# Patient Record
Sex: FEMALE | Race: WHITE | NOT HISPANIC OR LATINO | Employment: FULL TIME | URBAN - METROPOLITAN AREA
[De-identification: names, ages, dates, MRNs, and addresses within clinical notes are randomized per-mention and may not be internally consistent; named-entity substitution may affect disease eponyms.]

---

## 2018-05-10 ENCOUNTER — HOSPITAL ENCOUNTER (INPATIENT)
Facility: HOSPITAL | Age: 58
LOS: 3 days | Discharge: HOME | DRG: 390 | End: 2018-05-13
Attending: EMERGENCY MEDICINE | Admitting: SURGERY
Payer: COMMERCIAL

## 2018-05-10 ENCOUNTER — APPOINTMENT (EMERGENCY)
Dept: CT IMAGING | Facility: HOSPITAL | Age: 58
DRG: 390 | End: 2018-05-10
Payer: COMMERCIAL

## 2018-05-10 DIAGNOSIS — K56.609 BOWEL OBSTRUCTION (HCC): Primary | ICD-10-CM

## 2018-05-10 LAB
ALBUMIN SERPL BCP-MCNC: 4.4 G/DL (ref 3.5–5)
ALP SERPL-CCNC: 105 U/L (ref 46–116)
ALT SERPL W P-5'-P-CCNC: 32 U/L (ref 12–78)
ANION GAP SERPL CALCULATED.3IONS-SCNC: 11 MMOL/L (ref 4–13)
AST SERPL W P-5'-P-CCNC: 18 U/L (ref 5–45)
BACTERIA UR QL AUTO: ABNORMAL /HPF
BASOPHILS # BLD AUTO: 0.02 THOUSANDS/ΜL (ref 0–0.1)
BASOPHILS NFR BLD AUTO: 0 % (ref 0–1)
BILIRUB SERPL-MCNC: 0.7 MG/DL (ref 0.2–1)
BILIRUB UR QL STRIP: NEGATIVE
BUN SERPL-MCNC: 17 MG/DL (ref 5–25)
CALCIUM SERPL-MCNC: 9.5 MG/DL (ref 8.3–10.1)
CHLORIDE SERPL-SCNC: 105 MMOL/L (ref 100–108)
CLARITY UR: CLEAR
CO2 SERPL-SCNC: 24 MMOL/L (ref 21–32)
COLOR UR: YELLOW
CREAT SERPL-MCNC: 0.83 MG/DL (ref 0.6–1.3)
EOSINOPHIL # BLD AUTO: 0.04 THOUSAND/ΜL (ref 0–0.61)
EOSINOPHIL NFR BLD AUTO: 0 % (ref 0–6)
ERYTHROCYTE [DISTWIDTH] IN BLOOD BY AUTOMATED COUNT: 12.5 % (ref 11.6–15.1)
GFR SERPL CREATININE-BSD FRML MDRD: 79 ML/MIN/1.73SQ M
GLUCOSE SERPL-MCNC: 132 MG/DL (ref 65–140)
GLUCOSE UR STRIP-MCNC: NEGATIVE MG/DL
HCT VFR BLD AUTO: 42.7 % (ref 34.8–46.1)
HGB BLD-MCNC: 14.8 G/DL (ref 11.5–15.4)
HGB UR QL STRIP.AUTO: NEGATIVE
KETONES UR STRIP-MCNC: ABNORMAL MG/DL
LACTATE SERPL-SCNC: 1.1 MMOL/L (ref 0.5–2)
LEUKOCYTE ESTERASE UR QL STRIP: NEGATIVE
LYMPHOCYTES # BLD AUTO: 1.34 THOUSANDS/ΜL (ref 0.6–4.47)
LYMPHOCYTES NFR BLD AUTO: 12 % (ref 14–44)
MCH RBC QN AUTO: 29.1 PG (ref 26.8–34.3)
MCHC RBC AUTO-ENTMCNC: 34.7 G/DL (ref 31.4–37.4)
MCV RBC AUTO: 84 FL (ref 82–98)
MONOCYTES # BLD AUTO: 0.67 THOUSAND/ΜL (ref 0.17–1.22)
MONOCYTES NFR BLD AUTO: 6 % (ref 4–12)
MUCOUS THREADS UR QL AUTO: ABNORMAL
NEUTROPHILS # BLD AUTO: 9.32 THOUSANDS/ΜL (ref 1.85–7.62)
NEUTS SEG NFR BLD AUTO: 82 % (ref 43–75)
NITRITE UR QL STRIP: NEGATIVE
NON-SQ EPI CELLS URNS QL MICRO: ABNORMAL /HPF
PH UR STRIP.AUTO: 6.5 [PH] (ref 4.5–8)
PLATELET # BLD AUTO: 305 THOUSANDS/UL (ref 149–390)
PMV BLD AUTO: 10.6 FL (ref 8.9–12.7)
POTASSIUM SERPL-SCNC: 3.9 MMOL/L (ref 3.5–5.3)
PROT SERPL-MCNC: 7.8 G/DL (ref 6.4–8.2)
PROT UR STRIP-MCNC: ABNORMAL MG/DL
RBC # BLD AUTO: 5.08 MILLION/UL (ref 3.81–5.12)
RBC #/AREA URNS AUTO: ABNORMAL /HPF
SODIUM SERPL-SCNC: 140 MMOL/L (ref 136–145)
SP GR UR STRIP.AUTO: 1.02 (ref 1–1.03)
UROBILINOGEN UR QL STRIP.AUTO: 0.2 E.U./DL
WBC # BLD AUTO: 11.39 THOUSAND/UL (ref 4.31–10.16)
WBC #/AREA URNS AUTO: ABNORMAL /HPF

## 2018-05-10 PROCEDURE — 36415 COLL VENOUS BLD VENIPUNCTURE: CPT | Performed by: PHYSICIAN ASSISTANT

## 2018-05-10 PROCEDURE — 99285 EMERGENCY DEPT VISIT HI MDM: CPT

## 2018-05-10 PROCEDURE — 83605 ASSAY OF LACTIC ACID: CPT | Performed by: PHYSICIAN ASSISTANT

## 2018-05-10 PROCEDURE — 96374 THER/PROPH/DIAG INJ IV PUSH: CPT

## 2018-05-10 PROCEDURE — 74177 CT ABD & PELVIS W/CONTRAST: CPT

## 2018-05-10 PROCEDURE — 81001 URINALYSIS AUTO W/SCOPE: CPT

## 2018-05-10 PROCEDURE — 96375 TX/PRO/DX INJ NEW DRUG ADDON: CPT

## 2018-05-10 PROCEDURE — 96361 HYDRATE IV INFUSION ADD-ON: CPT

## 2018-05-10 PROCEDURE — 80053 COMPREHEN METABOLIC PANEL: CPT | Performed by: PHYSICIAN ASSISTANT

## 2018-05-10 PROCEDURE — 85025 COMPLETE CBC W/AUTO DIFF WBC: CPT | Performed by: PHYSICIAN ASSISTANT

## 2018-05-10 PROCEDURE — 96376 TX/PRO/DX INJ SAME DRUG ADON: CPT

## 2018-05-10 RX ORDER — PANTOPRAZOLE SODIUM 40 MG/1
40 INJECTION, POWDER, FOR SOLUTION INTRAVENOUS
Status: DISCONTINUED | OUTPATIENT
Start: 2018-05-11 | End: 2018-05-13 | Stop reason: HOSPADM

## 2018-05-10 RX ORDER — LIDOCAINE HYDROCHLORIDE 20 MG/ML
JELLY TOPICAL ONCE
Status: COMPLETED | OUTPATIENT
Start: 2018-05-10 | End: 2018-05-10

## 2018-05-10 RX ORDER — MULTIVITAMIN
1 TABLET ORAL DAILY
COMMUNITY
End: 2021-02-13

## 2018-05-10 RX ORDER — VENLAFAXINE HYDROCHLORIDE 75 MG/1
75 CAPSULE, EXTENDED RELEASE ORAL DAILY
COMMUNITY
Start: 2016-05-09

## 2018-05-10 RX ORDER — SODIUM CHLORIDE, SODIUM LACTATE, POTASSIUM CHLORIDE, CALCIUM CHLORIDE 600; 310; 30; 20 MG/100ML; MG/100ML; MG/100ML; MG/100ML
125 INJECTION, SOLUTION INTRAVENOUS CONTINUOUS
Status: DISCONTINUED | OUTPATIENT
Start: 2018-05-10 | End: 2018-05-13 | Stop reason: HOSPADM

## 2018-05-10 RX ORDER — ONDANSETRON 2 MG/ML
4 INJECTION INTRAMUSCULAR; INTRAVENOUS EVERY 6 HOURS PRN
Status: DISCONTINUED | OUTPATIENT
Start: 2018-05-10 | End: 2018-05-13 | Stop reason: HOSPADM

## 2018-05-10 RX ORDER — ONDANSETRON 2 MG/ML
4 INJECTION INTRAMUSCULAR; INTRAVENOUS ONCE
Status: COMPLETED | OUTPATIENT
Start: 2018-05-10 | End: 2018-05-10

## 2018-05-10 RX ADMIN — SODIUM CHLORIDE, SODIUM LACTATE, POTASSIUM CHLORIDE, AND CALCIUM CHLORIDE 125 ML/HR: .6; .31; .03; .02 INJECTION, SOLUTION INTRAVENOUS at 16:00

## 2018-05-10 RX ADMIN — ONDANSETRON 4 MG: 2 INJECTION INTRAMUSCULAR; INTRAVENOUS at 12:45

## 2018-05-10 RX ADMIN — IOHEXOL 100 ML: 350 INJECTION, SOLUTION INTRAVENOUS at 14:40

## 2018-05-10 RX ADMIN — ENOXAPARIN SODIUM 40 MG: 40 INJECTION SUBCUTANEOUS at 16:20

## 2018-05-10 RX ADMIN — SODIUM CHLORIDE, SODIUM LACTATE, POTASSIUM CHLORIDE, AND CALCIUM CHLORIDE 125 ML/HR: .6; .31; .03; .02 INJECTION, SOLUTION INTRAVENOUS at 21:10

## 2018-05-10 RX ADMIN — HYDROMORPHONE HYDROCHLORIDE 0.5 MG: 1 INJECTION, SOLUTION INTRAMUSCULAR; INTRAVENOUS; SUBCUTANEOUS at 17:04

## 2018-05-10 RX ADMIN — Medication 1 SPRAY: at 19:28

## 2018-05-10 RX ADMIN — HYDROMORPHONE HYDROCHLORIDE 0.5 MG: 1 INJECTION, SOLUTION INTRAMUSCULAR; INTRAVENOUS; SUBCUTANEOUS at 20:53

## 2018-05-10 RX ADMIN — Medication 1 SPRAY: at 17:00

## 2018-05-10 RX ADMIN — SODIUM CHLORIDE 1000 ML: 0.9 INJECTION, SOLUTION INTRAVENOUS at 12:42

## 2018-05-10 RX ADMIN — ONDANSETRON 4 MG: 2 INJECTION INTRAMUSCULAR; INTRAVENOUS at 14:05

## 2018-05-10 RX ADMIN — HYDROMORPHONE HYDROCHLORIDE 1 MG: 1 INJECTION, SOLUTION INTRAMUSCULAR; INTRAVENOUS; SUBCUTANEOUS at 12:48

## 2018-05-10 RX ADMIN — HYDROMORPHONE HYDROCHLORIDE 1 MG: 1 INJECTION, SOLUTION INTRAMUSCULAR; INTRAVENOUS; SUBCUTANEOUS at 14:08

## 2018-05-10 RX ADMIN — LIDOCAINE HYDROCHLORIDE: 20 JELLY TOPICAL at 16:10

## 2018-05-10 NOTE — ED PROVIDER NOTES
History  Chief Complaint   Patient presents with    Abdominal Pain     Pt presents to the ED for evaluation of "excruciating" abdominal pain in the lower right abdomen that started last night  Pt reports Nausea and vomitting x 2  Pt reports pain worsens when laying down     This is a 61-year-old female patient who has a history of cholecystectomy appendectomy and hysterectomy  Last night she started with severe sharp pain in her right lower quadrant they will is now radiating posterior  Today she had 2-3 bouts of vomiting  She has no known injury  She did have some chills last night but does not believe she had fever  Holding still makes it better any movement makes it worse  She denies urgency frequency or dysuria  No vaginal bleeding or discharge  No headache blurred vision double vision no cough congestion sore throat  She stated that her last who bowel movement was just prior to arrival and was soft but not vivi diarrhea without blood  She has taken nothing over-the-counter  Differential diagnosis includes not limited to diverticulitis, colitis, ovarian torsion, kidney stone, bowel obstructions multiple previous surgeries            Prior to Admission Medications   Prescriptions Last Dose Informant Patient Reported? Taking? Calcium-Vitamin D-Vitamin K (VIACTIV) [phone] MG-UNT-MCG CHEW  Self Yes Yes   Sig: Chew 1 tablet daily   Multiple Vitamin (MULTIVITAMIN) tablet  Self Yes Yes   Sig: Take 1 tablet by mouth daily   venlafaxine (EFFEXOR-XR) 75 mg 24 hr capsule  Self Yes Yes   Sig: Take 75 mg by mouth daily      Facility-Administered Medications: None       History reviewed  No pertinent past medical history  Past Surgical History:   Procedure Laterality Date    APPENDECTOMY      CHOLECYSTECTOMY      HYSTERECTOMY      NECK SURGERY         History reviewed  No pertinent family history  I have reviewed and agree with the history as documented      Social History   Substance Use Topics    Smoking status: Never Smoker    Smokeless tobacco: Not on file    Alcohol use No        Review of Systems   All other systems reviewed and are negative  Physical Exam  ED Triage Vitals [05/10/18 1149]   Temperature Pulse Respirations Blood Pressure SpO2   98 1 °F (36 7 °C) 81 20 128/60 100 %      Temp Source Heart Rate Source Patient Position - Orthostatic VS BP Location FiO2 (%)   Oral Monitor Sitting Left arm --      Pain Score       8           Orthostatic Vital Signs  Vitals:    05/10/18 1149 05/10/18 1340 05/10/18 1400 05/10/18 1456   BP: 128/60 115/57 113/62 113/65   Pulse: 81 69 60 68   Patient Position - Orthostatic VS: Sitting Lying Lying Lying       Physical Exam   Constitutional: She appears well-developed and well-nourished  HENT:   Head: Normocephalic and atraumatic  Right Ear: External ear normal    Left Ear: External ear normal    Nose: Nose normal    Mouth/Throat: Oropharynx is clear and moist    Eyes: Conjunctivae are normal  Pupils are equal, round, and reactive to light  Neck: Normal range of motion  Neck supple  Cardiovascular: Normal rate and regular rhythm  Pulmonary/Chest: Effort normal and breath sounds normal    Abdominal: Soft  Bowel sounds are normal        Neurological: She is alert  Skin: Skin is warm  Psychiatric: She has a normal mood and affect  Her behavior is normal    Nursing note and vitals reviewed        ED Medications  Medications   sodium chloride 0 9 % bolus 1,000 mL (0 mL Intravenous Stopped 5/10/18 1400)   HYDROmorphone (DILAUDID) injection 1 mg (1 mg Intravenous Given 5/10/18 1248)   ondansetron (ZOFRAN) injection 4 mg (4 mg Intravenous Given 5/10/18 1245)   HYDROmorphone (DILAUDID) injection 1 mg (1 mg Intravenous Given 5/10/18 1408)   ondansetron (ZOFRAN) injection 4 mg (4 mg Intravenous Given 5/10/18 1405)   iohexol (OMNIPAQUE) 350 MG/ML injection (MULTI-DOSE) 100 mL (100 mL Intravenous Given 5/10/18 1440)       Diagnostic Studies  Results Reviewed Procedure Component Value Units Date/Time    Urine Microscopic [[id number]]  (Abnormal) Collected:  05/10/18 1257    Lab Status:  Final result Specimen:  Urine from Urine, Clean Catch Updated:  05/10/18 1323     RBC, UA None Seen /hpf      WBC, UA None Seen /hpf      Epithelial Cells Occasional /hpf      Bacteria, UA Occasional /hpf      MUCOUS THREADS Innumerable (A)    Comprehensive metabolic panel [[id number]] Collected:  05/10/18 1240    Lab Status:  Final result Specimen:  Blood from Arm, Right Updated:  05/10/18 1308     Sodium 140 mmol/L      Potassium 3 9 mmol/L      Chloride 105 mmol/L      CO2 24 mmol/L      Anion Gap 11 mmol/L      BUN 17 mg/dL      Creatinine 0 83 mg/dL      Glucose 132 mg/dL      Calcium 9 5 mg/dL      AST 18 U/L      ALT 32 U/L      Alkaline Phosphatase 105 U/L      Total Protein 7 8 g/dL      Albumin 4 4 g/dL      Total Bilirubin 0 70 mg/dL      eGFR 79 ml/min/1 73sq m     Narrative:         National Kidney Disease Education Program recommendations are as follows:  GFR calculation is accurate only with a steady state creatinine  Chronic Kidney disease less than 60 ml/min/1 73 sq  meters  Kidney failure less than 15 ml/min/1 73 sq  meters  Lactic acid, plasma [[id number]]  (Normal) Collected:  05/10/18 1240    Lab Status:  Final result Specimen:  Blood from Arm, Right Updated:  05/10/18 1308     LACTIC ACID 1 1 mmol/L     Narrative:         Result may be elevated if tourniquet was used during collection      ED Urine Macroscopic [[id number]]  (Abnormal) Collected:  05/10/18 1257    Lab Status:  Final result Specimen:  Urine Updated:  05/10/18 1255     Color, UA Yellow     Clarity, UA Clear     pH, UA 6 5     Leukocytes, UA Negative     Nitrite, UA Negative     Protein, UA Trace (A) mg/dl      Glucose, UA Negative mg/dl      Ketones, UA 80 (3+) (A) mg/dl      Urobilinogen, UA 0 2 E U /dl      Bilirubin, UA Negative     Blood, UA Negative     Specific Gravity, UA 1 025    Narrative: CLINITEK RESULT    CBC and differential [[id number]]  (Abnormal) Collected:  05/10/18 1240    Lab Status:  Final result Specimen:  Blood from Arm, Right Updated:  05/10/18 1249     WBC 11 39 (H) Thousand/uL      RBC 5 08 Million/uL      Hemoglobin 14 8 g/dL      Hematocrit 42 7 %      MCV 84 fL      MCH 29 1 pg      MCHC 34 7 g/dL      RDW 12 5 %      MPV 10 6 fL      Platelets 567 Thousands/uL      Neutrophils Relative 82 (H) %      Lymphocytes Relative 12 (L) %      Monocytes Relative 6 %      Eosinophils Relative 0 %      Basophils Relative 0 %      Neutrophils Absolute 9 32 (H) Thousands/µL      Lymphocytes Absolute 1 34 Thousands/µL      Monocytes Absolute 0 67 Thousand/µL      Eosinophils Absolute 0 04 Thousand/µL      Basophils Absolute 0 02 Thousands/µL                  CT abdomen pelvis with contrast   Final Result by Senait Roman MD (05/10 1522)      High-grade small bowel obstruction in the right lower quadrant likely due to an adhesion best appreciated on image 601/50 with fecalization of bowel contents in the adjacent proximal ileal loop  Minimal pelvic free fluid  Workstation performed: [id number]                    Procedures  Procedures       Phone Contacts  ED Phone Contact    ED Course                               MDM  Number of Diagnoses or Management Options  Diagnosis management comments: This is a 55-year-old female patient presented 1 day history of right lower quadrant pain and vomiting  CT scan revealed a high-grade bowel obstruction right lower quadrant secondary to adhesions  Spoke with Dr Angelia Carrizales explained case in detail who accepted admission  CritCare Time    Disposition  Final diagnoses:    Bowel obstruction ([address] )     Time reflects when diagnosis was documented in both MDM as applicable and the Disposition within this note     Time User Action Codes Description Comment    5/10/2018  3:36  [address] [R42 [id number]] Bowel obstruction [facility])       ED Disposition ED Disposition Condition Comment    Admit  Case was discussed with dr Vasyl Cota and the patient's admission status was agreed to be Admission Status: inpatient status to the service of Dr Vasyl Cota   Follow-up Information    None       Patient's Medications   Discharge Prescriptions    No medications on file     No discharge procedures on file      ED Provider  Electronically Signed by           Kayleigh Sharma PA-C  05/10/18 1539

## 2018-05-10 NOTE — H&P
History and Physical - General Surgery  Krunal Amaya 65 y o  female MRN: [id number]  Unit/Bed#: ED 05 Encounter: [id number]        Assessment/Plan     Assessment:  65 F with high grade SBO    Plan:  NPO  Will place NGT, keep to low continuous wall suction  IV fluids  OOB and ambulate  PRN pain control  Lovenox for DVT ppx    History of Present Illness     HPI:  Krunal Amaya is a 65 y o  female who presents with abdominal pain and bloating  She states it started yesterday with crampy pain  This worsened and became sharp in nature  She also endorses nausea and vomiting which was non-bloody  She denies any changes in bowel habits  Her last bowel movement was this morning  She does have a past surgical history significant for cholecystectomy, hysterectomy, and appendectomy  She is otherwise relatively healthy  She denies fevers, chills, chest pain, SOB, dysuria or other urinary symptoms    Review of Systems   Constitutional: Positive for appetite change  Negative for activity change, chills, fatigue and fever  HENT: Negative  Eyes: Negative  Respiratory: Negative  Cardiovascular: Negative  Gastrointestinal: Positive for abdominal distention, abdominal pain, nausea and vomiting  Negative for blood in stool and diarrhea  Endocrine: Negative  Genitourinary: Negative  Musculoskeletal: Negative  Skin: Negative  Allergic/Immunologic: Negative  Neurological: Negative  Hematological: Negative  Psychiatric/Behavioral: Negative  Historical Information   History reviewed  No pertinent past medical history    Past Surgical History:   Procedure Laterality Date    APPENDECTOMY      CHOLECYSTECTOMY      HYSTERECTOMY      NECK SURGERY       Social History   History   Alcohol Use No     History   Drug Use No     History   Smoking Status    Never Smoker   Smokeless Tobacco    Not on file     Family History: non-contributory    Meds/Allergies   PTA meds:   Prior to Admission Medications Prescriptions Last Dose Informant Patient Reported? Taking? Calcium-Vitamin D-Vitamin K (VIACTIV) [phone] MG-UNT-MCG CHEW  Self Yes Yes   Sig: Chew 1 tablet daily   Multiple Vitamin (MULTIVITAMIN) tablet  Self Yes Yes   Sig: Take 1 tablet by mouth daily   venlafaxine (EFFEXOR-XR) 75 mg 24 hr capsule  Self Yes Yes   Sig: Take 75 mg by mouth daily      Facility-Administered Medications: None     No Known Allergies    Objective   First Vitals:   Blood Pressure: 128/60 (05/10/18 1149)  Pulse: 81 (05/10/18 1149)  Temperature: 98 1 °F (36 7 °C) (05/10/18 1149)  Temp Source: Oral (05/10/18 1149)  Respirations: 20 (05/10/18 1149)  SpO2: 100 % (05/10/18 1149)    Current Vitals:   Blood Pressure: 113/65 (05/10/18 1456)  Pulse: 68 (05/10/18 1456)  Temperature: 98 1 °F (36 7 °C) (05/10/18 1149)  Temp Source: Oral (05/10/18 1149)  Respirations: 18 (05/10/18 1456)  SpO2: 97 % (05/10/18 1456)      Intake/Output Summary (Last 24 hours) at 05/10/18 1537  Last data filed at 05/10/18 1400   Gross per 24 hour   Intake             1000 ml   Output                0 ml   Net             1000 ml       Invasive Devices     Peripheral Intravenous Line            Peripheral IV 05/10/18 Right Antecubital less than 1 day                Physical Exam   Constitutional: She is oriented to person, place, and time  She appears well-developed and well-nourished  HENT:   Head: Normocephalic  Eyes: Pupils are equal, round, and reactive to light  Neck: Normal range of motion  Cardiovascular: Normal rate and regular rhythm  Pulmonary/Chest: Effort normal  No respiratory distress  She exhibits no tenderness  Abdominal: Soft  She exhibits no distension  There is no tenderness  There is no rebound and no guarding  Musculoskeletal: Normal range of motion  She exhibits no tenderness  Neurological: She is alert and oriented to person, place, and time  Skin: Skin is warm and dry  Psychiatric: She has a normal mood and affect   Her behavior is normal        Lab Results:   CBC:   Lab Results   Component Value Date    WBC 11 39 (H) 05/10/2018    HGB 14 8 05/10/2018    HCT 42 7 05/10/2018    MCV 84 05/10/2018     05/10/2018    MCH 29 1 05/10/2018    MCHC 34 7 05/10/2018    RDW 12 5 05/10/2018    MPV 10 6 05/10/2018   , CMP:   Lab Results   Component Value Date     05/10/2018    K 3 9 05/10/2018     05/10/2018    CO2 24 05/10/2018    ANIONGAP 11 05/10/2018    BUN 17 05/10/2018    CREATININE 0 83 05/10/2018    GLUCOSE 132 05/10/2018    CALCIUM 9 5 05/10/2018    AST 18 05/10/2018    ALT 32 05/10/2018    ALKPHOS 105 05/10/2018    PROT 7 8 05/10/2018    BILITOT 0 70 05/10/2018    EGFR 79 05/10/2018     Imaging: I have personally reviewed pertinent reports  EKG, Pathology, and Other Studies: I have personally reviewed pertinent reports        Code Status: No Order  Advance Directive and Living Will:      Power of :    POLST:

## 2018-05-10 NOTE — ED NOTES
Pt assisted onto hospital bed for increased patient comfort  NG tube patent-on low-intermittent suction-yellow bile  Pt comfortable  VS  Will continue to monitor       Nya Abdi RN  05/10/18 2175

## 2018-05-11 ENCOUNTER — APPOINTMENT (INPATIENT)
Dept: RADIOLOGY | Facility: HOSPITAL | Age: 58
DRG: 390 | End: 2018-05-11
Payer: COMMERCIAL

## 2018-05-11 PROBLEM — K56.609 SBO (SMALL BOWEL OBSTRUCTION) (HCC): Status: ACTIVE | Noted: 2018-05-11

## 2018-05-11 LAB
ANION GAP SERPL CALCULATED.3IONS-SCNC: 9 MMOL/L (ref 4–13)
BASOPHILS # BLD AUTO: 0.04 THOUSANDS/ΜL (ref 0–0.1)
BASOPHILS NFR BLD AUTO: 1 % (ref 0–1)
BUN SERPL-MCNC: 12 MG/DL (ref 5–25)
CALCIUM SERPL-MCNC: 8.5 MG/DL (ref 8.3–10.1)
CHLORIDE SERPL-SCNC: 109 MMOL/L (ref 100–108)
CO2 SERPL-SCNC: 26 MMOL/L (ref 21–32)
CREAT SERPL-MCNC: 0.79 MG/DL (ref 0.6–1.3)
EOSINOPHIL # BLD AUTO: 0.28 THOUSAND/ΜL (ref 0–0.61)
EOSINOPHIL NFR BLD AUTO: 4 % (ref 0–6)
ERYTHROCYTE [DISTWIDTH] IN BLOOD BY AUTOMATED COUNT: 12.6 % (ref 11.6–15.1)
GFR SERPL CREATININE-BSD FRML MDRD: 84 ML/MIN/1.73SQ M
GLUCOSE SERPL-MCNC: 96 MG/DL (ref 65–140)
HCT VFR BLD AUTO: 39.1 % (ref 34.8–46.1)
HGB BLD-MCNC: 13 G/DL (ref 11.5–15.4)
LYMPHOCYTES # BLD AUTO: 2.53 THOUSANDS/ΜL (ref 0.6–4.47)
LYMPHOCYTES NFR BLD AUTO: 34 % (ref 14–44)
MAGNESIUM SERPL-MCNC: 2 MG/DL (ref 1.6–2.6)
MCH RBC QN AUTO: 29.3 PG (ref 26.8–34.3)
MCHC RBC AUTO-ENTMCNC: 33.2 G/DL (ref 31.4–37.4)
MCV RBC AUTO: 88 FL (ref 82–98)
MONOCYTES # BLD AUTO: 0.71 THOUSAND/ΜL (ref 0.17–1.22)
MONOCYTES NFR BLD AUTO: 9 % (ref 4–12)
NEUTROPHILS # BLD AUTO: 3.97 THOUSANDS/ΜL (ref 1.85–7.62)
NEUTS SEG NFR BLD AUTO: 52 % (ref 43–75)
PHOSPHATE SERPL-MCNC: 3.7 MG/DL (ref 2.7–4.5)
PLATELET # BLD AUTO: 239 THOUSANDS/UL (ref 149–390)
PMV BLD AUTO: 10.6 FL (ref 8.9–12.7)
POTASSIUM SERPL-SCNC: 3.6 MMOL/L (ref 3.5–5.3)
RBC # BLD AUTO: 4.44 MILLION/UL (ref 3.81–5.12)
SODIUM SERPL-SCNC: 144 MMOL/L (ref 136–145)
WBC # BLD AUTO: 7.53 THOUSAND/UL (ref 4.31–10.16)

## 2018-05-11 PROCEDURE — 85025 COMPLETE CBC W/AUTO DIFF WBC: CPT | Performed by: STUDENT IN AN ORGANIZED HEALTH CARE EDUCATION/TRAINING PROGRAM

## 2018-05-11 PROCEDURE — 80048 BASIC METABOLIC PNL TOTAL CA: CPT | Performed by: STUDENT IN AN ORGANIZED HEALTH CARE EDUCATION/TRAINING PROGRAM

## 2018-05-11 PROCEDURE — 74022 RADEX COMPL AQT ABD SERIES: CPT

## 2018-05-11 PROCEDURE — 87493 C DIFF AMPLIFIED PROBE: CPT | Performed by: SURGERY

## 2018-05-11 PROCEDURE — 84100 ASSAY OF PHOSPHORUS: CPT | Performed by: SURGERY

## 2018-05-11 PROCEDURE — 83735 ASSAY OF MAGNESIUM: CPT | Performed by: STUDENT IN AN ORGANIZED HEALTH CARE EDUCATION/TRAINING PROGRAM

## 2018-05-11 PROCEDURE — C9113 INJ PANTOPRAZOLE SODIUM, VIA: HCPCS | Performed by: SURGERY

## 2018-05-11 RX ORDER — POTASSIUM CHLORIDE 14.9 MG/ML
20 INJECTION INTRAVENOUS ONCE
Status: COMPLETED | OUTPATIENT
Start: 2018-05-11 | End: 2018-05-11

## 2018-05-11 RX ADMIN — ENOXAPARIN SODIUM 40 MG: 40 INJECTION SUBCUTANEOUS at 08:49

## 2018-05-11 RX ADMIN — SODIUM CHLORIDE, SODIUM LACTATE, POTASSIUM CHLORIDE, AND CALCIUM CHLORIDE 125 ML/HR: .6; .31; .03; .02 INJECTION, SOLUTION INTRAVENOUS at 21:49

## 2018-05-11 RX ADMIN — PANTOPRAZOLE SODIUM 40 MG: 40 INJECTION, POWDER, FOR SOLUTION INTRAVENOUS at 08:49

## 2018-05-11 RX ADMIN — HYDROMORPHONE HYDROCHLORIDE 0.2 MG: 1 INJECTION, SOLUTION INTRAMUSCULAR; INTRAVENOUS; SUBCUTANEOUS at 05:13

## 2018-05-11 RX ADMIN — HYDROMORPHONE HYDROCHLORIDE 0.5 MG: 1 INJECTION, SOLUTION INTRAMUSCULAR; INTRAVENOUS; SUBCUTANEOUS at 00:26

## 2018-05-11 RX ADMIN — SODIUM CHLORIDE, SODIUM LACTATE, POTASSIUM CHLORIDE, AND CALCIUM CHLORIDE 125 ML/HR: .6; .31; .03; .02 INJECTION, SOLUTION INTRAVENOUS at 05:10

## 2018-05-11 RX ADMIN — POTASSIUM CHLORIDE 20 MEQ: 200 INJECTION, SOLUTION INTRAVENOUS at 07:42

## 2018-05-11 RX ADMIN — SODIUM CHLORIDE, SODIUM LACTATE, POTASSIUM CHLORIDE, AND CALCIUM CHLORIDE 125 ML/HR: .6; .31; .03; .02 INJECTION, SOLUTION INTRAVENOUS at 13:45

## 2018-05-11 NOTE — CASE MANAGEMENT
Initial Clinical Review    Admission: Date/Time/Statement: 5/10/18 @ 1538     Orders Placed This Encounter   Procedures    Inpatient Admission (expected length of stay for this patient is greater than two midnights)     Standing Status:   Standing     Number of Occurrences:   1     Order Specific Question:   Admitting Physician     Answer:   GEORGES Lund [[id number]]     Order Specific Question:   Level of Care     Answer:   Med Surg [[id number]]     Order Specific Question:   Estimated length of stay     Answer:   More than 2 Midnights     Order Specific Question:   Certification     Answer:   I certify that inpatient services are medically necessary for this patient for a duration of greater than two midnights  See H&P and MD Progress Notes for additional information about the patient's course of treatment  ED: Date/Time/Mode of Arrival:   ED Arrival Information     Expected Arrival Acuity Means of Arrival Escorted By Service Admission Type    - 5/10/2018 11:36 Urgent Walk-In Self Surgery-General Urgent    Arrival Complaint    Abdominal Pain           Chief Complaint:   Chief Complaint   Patient presents with    Abdominal Pain     Pt presents to the ED for evaluation of "excruciating" abdominal pain in the lower right abdomen that started last night  Pt reports Nausea and vomitting x 2  Pt reports pain worsens when laying down       History of Illness:  65 y o  female who presents with abdominal pain and bloating  She states it started yesterday with crampy pain  This worsened and became sharp in nature  She also endorses nausea and vomiting which was non-bloody  She denies any changes in bowel habits  Her last bowel movement was this morning  She does have a past surgical history significant for cholecystectomy, hysterectomy, and appendectomy       ED Vital Signs:   ED Triage Vitals [05/10/18 1149]   Temperature Pulse Respirations Blood Pressure SpO2   98 1 °F (36 7 °C) 81 20 128/60 100 %      Temp Source Heart Rate Source Patient Position - Orthostatic VS BP Location FiO2 (%)   Oral Monitor Sitting Left arm --      Pain Score       8        Wt Readings from Last 1 Encounters:   05/10/18 88 kg (194 lb 0 1 oz)       Vital Signs (abnormal): none  Exam abdomen - RLQ with guarding and some rebound    Abnormal Labs/Diagnostic Test Results:   Wbc 11 39  UA tr protein  3 + ketones  Ct abdomen - High-grade small bowel obstruction in the right lower quadrant likely due to an adhesion best appreciated on image 601/50 with fecalization of bowel contents in the adjacent proximal ileal loop   Minimal pelvic free fluid      ED Treatment: NGT to medium suction  Medication Administration from 05/10/2018 1136 to 05/10/2018 2027       Date/Time Order Dose Route Action Action by Comments     05/10/2018 1400 sodium chloride 0 9 % bolus 1,000 mL 0 mL Intravenous Stopped Melissa Pereira RN      05/10/2018 1242 sodium chloride 0 9 % bolus 1,000 mL 1,000 mL Intravenous [address] Sakshi Vazquez RN      05/10/2018 1248 HYDROmorphone (DILAUDID) injection 1 mg 1 mg Intravenous Given Sakshi Vazquez RN      05/10/2018 1245 ondansetron (ZOFRAN) injection 4 mg 4 mg Intravenous Given Sakshi Vazquez RN      05/10/2018 1408 HYDROmorphone (DILAUDID) injection 1 mg 1 mg Intravenous Given Melissa Pereira RN      05/10/2018 1405 ondansetron (ZOFRAN) injection 4 mg 4 mg Intravenous Given Melissa Pereira RN      05/10/2018 1440 iohexol (OMNIPAQUE) 350 MG/ML injection (MULTI-DOSE) 100 mL 100 mL Intravenous Given Catalina Blue      05/10/2018 1600 lactated ringers infusion 125 mL/hr Intravenous New Bag Melissa Pereira RN      05/10/2018 1620 enoxaparin (LOVENOX) subcutaneous injection 40 mg 40 mg Subcutaneous Given Melissa Pereira RN      05/10/2018 1704 HYDROmorphone (DILAUDID) injection 0 5 mg 0 5 mg Intravenous Given Jose L Gaines RN      05/10/2018 1705 HYDROmorphone (DILAUDID) injection 0 2 mg 0 2 mg Intravenous Not Given Melissa Pereira RN      05/10/2018 1610 lidocaine (URO-JET) 2 % topical gel   Topical Given Nate Lowry RN medication used for NG tube insertion     05/10/2018 1928 phenol (CHLORASEPTIC) 1 4 % mucosal liquid 1 spray 1 spray Mouth/Throat Given Clark Taylor RN      05/10/2018 1700 phenol (CHLORASEPTIC) 1 4 % mucosal liquid 1 spray 1 spray Mouth/Throat Given Nate Lowry RN           Past Medical/Surgical History: Active Ambulatory Problems     Diagnosis Date Noted    No Active Ambulatory Problems     Resolved Ambulatory Problems     Diagnosis Date Noted    No Resolved Ambulatory Problems     No Additional Past Medical History       Admitting Diagnosis: Bowel obstruction ([facility] [address] ) [K56 609]  Abdominal pain [R10 9]    Age/Sex: 65 y o  female    Assessment/Plan:  65 F with high grade SBO     Plan:  NPO  Will place NGT, keep to low continuous wall suction  IV fluids  OOB and ambulate  PRN pain control  Lovenox for DVT ppx    Admission Orders: 5/10/2018  1538 INPATIENT  Scheduled Meds:   Current Facility-Administered Medications:  enoxaparin 40 mg Subcutaneous Daily Jenna Shelby MD    HYDROmorphone 0 2 mg Intravenous Q4H PRN Jenna Shelby MD    HYDROmorphone 0 5 mg Intravenous Q3H PRN Jenna Shelby MD    lactated ringers 125 mL/hr Intravenous Continuous Jenna Shelby MD Last Rate: 125 mL/hr (05/11/18 0510)   ondansetron 4 mg Intravenous Q6H PRN Jenna Shelby MD    pantoprazole 40 mg Intravenous Q24H [address] Yuniel Morejon MD    phenol 1 spray Mouth/Throat Q2H PRN Jeb Eason DO      Continuous Infusions:   lactated ringers 125 mL/hr Last Rate: 125 mL/hr (05/11/18 0510)     PRN Meds: HYDROmorphone 0 2 iv - used x 1 (5/11- 0513)    HYDROmorphone 0 5 iv - used x 2 (1704; 2053; 0026)    Phenol - used x 2       NGT to medium continuous suction  scds  NPO

## 2018-05-11 NOTE — PROGRESS NOTES
Progress Note - General Surgery   Charles Valencia 65 y o  female MRN: [id number]  Unit/Bed#: -01 Encounter: [id number]    Assessment:  A 65 y o  Presents with high grade SBO    Plan  1  SBO  - NPO/NGT decompression  - abdominal obstruction series today  - serial abdominal exams  - pain control prn  - oob/amb    2  DVT ppx: lovenox    3  GI ppx: Protonix    Subjective/Objective     Subjective: No acute events overnight  Patient feels markedly better with the NGT in place  No nausea, no vomiting  No flatus  No BM  Patient does not feel distended    Objective:     Vitals: Blood pressure 114/60, pulse 68, temperature 98 °F (36 7 °C), temperature source Oral, resp  rate 18, weight 88 kg (194 lb 0 1 oz), SpO2 95 %  ,There is no height or weight on file to calculate BMI  I/O       05/09 0701 - 05/10 0700 05/10 0701 - 05/11 0700 05/11 0701 - 05/12 0700    I V  (mL/kg)  1645 8 (18 7)     NG/GT  20     IV Piggyback  1000     Total Intake(mL/kg)  2665 8 (30 3)     Urine (mL/kg/hr)  400     Emesis/NG output  250     Total Output   650      Net   +2015 8                   Physical Exam:   Gen: comfortable, NAD  Abd: soft, ND, NT    Lab, Imaging and other studies:      Lab Results   Component Value Date    WBC 7 53 05/11/2018    HGB 13 0 05/11/2018    HCT 39 1 05/11/2018    MCV 88 05/11/2018     05/11/2018         Lab Results   Component Value Date    GLUCOSE 96 05/11/2018    CALCIUM 8 5 05/11/2018     05/11/2018    K 3 6 05/11/2018    CO2 26 05/11/2018     (H) 05/11/2018    BUN 12 05/11/2018    CREATININE 0 79 05/11/2018       VTE Pharmacologic Prophylaxis: Enoxaparin (Lovenox)  VTE Mechanical Prophylaxis: sequential compression device

## 2018-05-12 LAB — C DIFF TOX GENS STL QL NAA+PROBE: NORMAL

## 2018-05-12 PROCEDURE — C9113 INJ PANTOPRAZOLE SODIUM, VIA: HCPCS | Performed by: SURGERY

## 2018-05-12 RX ORDER — VENLAFAXINE HYDROCHLORIDE 37.5 MG/1
75 CAPSULE, EXTENDED RELEASE ORAL DAILY
Status: DISCONTINUED | OUTPATIENT
Start: 2018-05-12 | End: 2018-05-13 | Stop reason: HOSPADM

## 2018-05-12 RX ADMIN — SODIUM CHLORIDE, SODIUM LACTATE, POTASSIUM CHLORIDE, AND CALCIUM CHLORIDE 125 ML/HR: .6; .31; .03; .02 INJECTION, SOLUTION INTRAVENOUS at 22:51

## 2018-05-12 RX ADMIN — SODIUM CHLORIDE, SODIUM LACTATE, POTASSIUM CHLORIDE, AND CALCIUM CHLORIDE 125 ML/HR: .6; .31; .03; .02 INJECTION, SOLUTION INTRAVENOUS at 05:53

## 2018-05-12 RX ADMIN — VENLAFAXINE HYDROCHLORIDE 75 MG: 37.5 CAPSULE, EXTENDED RELEASE ORAL at 08:59

## 2018-05-12 RX ADMIN — ENOXAPARIN SODIUM 40 MG: 40 INJECTION SUBCUTANEOUS at 08:49

## 2018-05-12 RX ADMIN — PANTOPRAZOLE SODIUM 40 MG: 40 INJECTION, POWDER, FOR SOLUTION INTRAVENOUS at 08:49

## 2018-05-12 NOTE — PROGRESS NOTES
Progress Note -Surgery TRISH Sheparde Lelo 65 y o  female MRN: [id number]  Unit/Bed#: -01 Encounter: [id number]    ASSESSMENT/PLAN:  Problem List     * (Principal)SBO (small bowel obstruction) ([facility] [address] )   66 yo with SBO s/p NGT removal and is now tolerating fulls diet  C dif (-)  · Advance diet to surgical soft most likely  · Encouraged OOB and ambulate  · Pain control PRN  · Poss discharge home later today or tomorrow       VTE Pharmacologic Prophylaxis: Sequential compression device (Venodyne)  and Enoxaparin (Lovenox)    Subjective/Objective     Subjective: No complaints  Denies N/V  +flatus    Objective/Physical Exam: Blood pressure 124/64, pulse 76, temperature 97 9 °F (36 6 °C), temperature source Oral, resp  rate 16, weight 88 kg (194 lb 0 1 oz), SpO2 98 %  ,There is no height or weight on file to calculate BMI      General appearance: alert and oriented, in no acute distress  Heart: regular rate and rhythm, S1, S2 normal, no murmur, click, rub or gallop  Lungs: clear to auscultation bilaterally  Abdomen: soft, non-tender; bowel sounds normal; no masses,  no organomegaly  Neurological: normal without focal findings      Current Facility-Administered Medications:     enoxaparin (LOVENOX) subcutaneous injection 40 mg, 40 mg, Subcutaneous, Daily, Whitney Ghotra MD, 40 mg at 05/12/18 0849    HYDROmorphone (DILAUDID) injection 0 2 mg, 0 2 mg, Intravenous, Q4H PRN, Whitney Ghotra MD, 0 2 mg at 05/11/18 0513    HYDROmorphone (DILAUDID) injection 0 5 mg, 0 5 mg, Intravenous, Q3H PRN, Whitney Ghotra MD, 0 5 mg at 05/11/18 0026    lactated ringers infusion, 125 mL/hr, Intravenous, Continuous, Whitney Ghotra MD, Last Rate: 125 mL/hr at 05/13/18 0642, 125 mL/hr at 05/13/18 0642    ondansetron (ZOFRAN) injection 4 mg, 4 mg, Intravenous, Q6H PRN, Whitney Ghotra MD    pantoprazole (PROTONIX) injection 40 mg, 40 mg, Intravenous, Q24H [facility], Kahlil Curiel MD, 40 mg at 05/13/18 0740    phenol (CHLORASEPTIC) 1 4 % mucosal liquid 1 spray, 1 spray, Mouth/Throat, Q2H PRN, Jeb Eason DO, 1 spray at 05/10/18 1928    venlafaxine (EFFEXOR-XR) 24 hr capsule 75 mg, 75 mg, Oral, Daily, Ger Nieves MD, 75 mg at 05/13/18 0738      Intake/Output Summary (Last 24 hours) at 05/13/18 0854  Last data filed at 05/13/18 0642   Gross per 24 hour   Intake          6078 75 ml   Output             4250 ml   Net          1828 75 ml

## 2018-05-12 NOTE — PROGRESS NOTES
Progress Note - General Surgery   Karan Joy 65 y o  female MRN: [id number]  Unit/Bed#: -01 Encounter: [id number]        Subjective/Objective     Subjective:  Doing well  Passing flatus  Tolerating clears  Blood pressure 102/58, pulse 66, temperature 98 °F (36 7 °C), temperature source Oral, resp  rate 18, weight 88 kg (194 lb 0 1 oz), SpO2 99 %  ,There is no height or weight on file to calculate BMI  Intake/Output Summary (Last 24 hours) at 05/12/18 0824  Last data filed at 05/12/18 0554   Gross per 24 hour   Intake             1820 ml   Output             3325 ml   Net            -1505 ml           Physical Exam:   Abdomen flat soft and nontender  Assessment:  Partial small bowel obstruction secondary to adhesions    Plan: Will allow full liquids toast and crackers  Potential discharge later today versus tomorrow      Jeb Mccray DO  5/12/2018  8:24 AM

## 2018-05-13 VITALS
DIASTOLIC BLOOD PRESSURE: 64 MMHG | WEIGHT: 194 LBS | RESPIRATION RATE: 16 BRPM | OXYGEN SATURATION: 98 % | TEMPERATURE: 97.9 F | HEART RATE: 76 BPM | SYSTOLIC BLOOD PRESSURE: 124 MMHG

## 2018-05-13 PROCEDURE — C9113 INJ PANTOPRAZOLE SODIUM, VIA: HCPCS | Performed by: SURGERY

## 2018-05-13 RX ADMIN — VENLAFAXINE HYDROCHLORIDE 75 MG: 37.5 CAPSULE, EXTENDED RELEASE ORAL at 07:38

## 2018-05-13 RX ADMIN — SODIUM CHLORIDE, SODIUM LACTATE, POTASSIUM CHLORIDE, AND CALCIUM CHLORIDE 125 ML/HR: .6; .31; .03; .02 INJECTION, SOLUTION INTRAVENOUS at 06:42

## 2018-05-13 RX ADMIN — PANTOPRAZOLE SODIUM 40 MG: 40 INJECTION, POWDER, FOR SOLUTION INTRAVENOUS at 07:40

## 2018-05-13 NOTE — DISCHARGE SUMMARY
Date of admission:  05/10/2018    Discharge date:  05/13/2018    Final diagnosis:  Small-bowel obstruction secondary to adhesions    Hospital course:  28-year-old female presented the hospital with increasing abdominal pain nausea and vomiting  Workup through the emergency room included a CT scan revealing partial small-bowel obstruction  She was treated conservatively with NG tube decompression and IV fluids  NG tube was removed on 05/11/2018  Diet was slowly advanced to full liquids on 05/12/2018  By 05/13/2018 she was sexually back to her normal self  She was tolerating a diet  Abdomen was flat and soft  She was discharged home with instructions to resume a full liquid this soft diet over the next several days  She could slowly advanced as she tolerated things  She may resume typical activities  She is to follow up in my office as needed  She may call with questions or concerns

## 2018-05-13 NOTE — DISCHARGE INSTRUCTIONS
Bowel Obstruction    WHAT YOU SHOULD KNOW:    A bowel obstruction occurs when your large or small intestine is completely or partly blocked  The blockage prevents food and waste from passing through normally  AFTER YOU LEAVE: Following discharge from the hospital, you may have some questions about your recent hospitalization, your activities or your general condition  These instructions may answer some of your questions and help you adjust during the first few days following your hospitalization  Diet: Following discharge from the hospital remain on a full liquid diet for 1-2 days followed by a soft diet for another 3-5 days  Full liquid diet is considered anything you could pour down the kitchen sink  Ex: Soups, jell-O, pudding, yogurt, milkshakes, ice cream, juice, cream of wheat  Soft diet are foods that are easy to chew and swallow  Ex: eggs, cereal soften with milk, oatmeal, pasta, mashed potatoes, fish, bananas, applesauce  Take smaller bites and chew your food well  Eat smaller meals  After eating do not lay down, get up and walk for 10min  Avoid eating 2 hrs prior to bed  Avoid salad and fresh fruit with skin on it for 1-2 weeks  Although these foods are healthy for you, your bowels have a hard time breaking this food down  Be sure to consume plenty of water  Avoid alcohol  Bathing: You have not restrictions  You may shower, soak in tub, and go swimming  Activity/Restrictions: You may return to your normal activity as tolerated  Follow up appointment: There is no need to be seen by the hospital care team for follow up appointment unless otherwise specified during your stay  If you do require a follow up appointment the contact information will be provided in you discharge paper work  Contact your healthcare provider if:   · You have nausea and are vomiting  · Your abdomen is enlarged  · You cannot pass a bowel movement or gas     · You lose weight without trying  · You have blood in your bowel movement

## 2018-05-13 NOTE — PROGRESS NOTES
Progress Note - General Surgery   Kory Raya 65 y o  female MRN: [id number]  Unit/Bed#: -01 Encounter: [id number]        Subjective/Objective     Subjective:  No issues overnight  Tolerated full liquids  Passing flatus  Blood pressure 124/64, pulse 76, temperature 97 9 °F (36 6 °C), temperature source Oral, resp  rate 16, weight 88 kg (194 lb 0 1 oz), SpO2 98 %  ,There is no height or weight on file to calculate BMI  Intake/Output Summary (Last 24 hours) at 05/13/18 0851  Last data filed at 05/13/18 3600   Gross per 24 hour   Intake          6078 75 ml   Output             4250 ml   Net          1828 75 ml           Physical Exam:   Abdomen flat soft and nontender      Assessment:  Partial small bowel obstruction secondary he is  Not clinically improved      Plan:  Okay for discharge    Pa Reid DO  5/13/2018  8:51 AM

## 2019-01-30 ENCOUNTER — DOCTOR'S OFFICE (OUTPATIENT)
Dept: URBAN - METROPOLITAN AREA CLINIC 137 | Facility: CLINIC | Age: 59
Setting detail: OPHTHALMOLOGY
End: 2019-01-30
Payer: COMMERCIAL

## 2019-01-30 ENCOUNTER — OPTICAL OFFICE (OUTPATIENT)
Dept: URBAN - METROPOLITAN AREA CLINIC 146 | Facility: CLINIC | Age: 59
Setting detail: OPHTHALMOLOGY
End: 2019-01-30
Payer: COMMERCIAL

## 2019-01-30 DIAGNOSIS — H52.4: ICD-10-CM

## 2019-01-30 DIAGNOSIS — H52.13: ICD-10-CM

## 2019-01-30 PROCEDURE — V2100 LENS SPHER SINGLE PLANO 4.00: HCPCS | Performed by: OPTOMETRIST

## 2019-01-30 PROCEDURE — 92014 COMPRE OPH EXAM EST PT 1/>: CPT | Performed by: OPTOMETRIST

## 2019-01-30 PROCEDURE — 92015 DETERMINE REFRACTIVE STATE: CPT | Performed by: OPTOMETRIST

## 2019-01-30 PROCEDURE — V2103 SPHEROCYLINDR 4.00D/12-2.00D: HCPCS | Performed by: OPTOMETRIST

## 2019-01-30 PROCEDURE — V2750 ANTI-REFLECTIVE COATING: HCPCS | Performed by: OPTOMETRIST

## 2019-01-30 PROCEDURE — V2020 VISION SVCS FRAMES PURCHASES: HCPCS | Performed by: OPTOMETRIST

## 2019-01-30 ASSESSMENT — REFRACTION_MANIFEST
OS_VA2: 20/20(J1+)
OD_VA3: 20/
OD_VA2: 20/20(J1+)
OD_VA3: 20/
OD_VA1: 20/20
OS_SPHERE: -0.75
OS_VA1: 20/
OD_AXIS: 140
OD_SPHERE: -1.00
OS_VA1: 20/20
OU_VA: 20/
OU_VA: 20/
OS_VA2: 20/
OS_CYLINDER: SPH
OD_VA1: 20/
OS_VA3: 20/
OS_VA3: 20/
OD_CYLINDER: -0.25
OD_VA2: 20/
OS_ADD: +1.75
OD_ADD: +1.75

## 2019-01-30 ASSESSMENT — CONFRONTATIONAL VISUAL FIELD TEST (CVF)
OD_FINDINGS: FULL
OS_FINDINGS: FULL

## 2019-01-30 ASSESSMENT — REFRACTION_CURRENTRX
OD_OVR_VA: 20/
OD_OVR_VA: 20/
OS_OVR_VA: 20/
OD_VPRISM_DIRECTION: SV
OD_CYLINDER: +0.25
OD_OVR_VA: 20/
OD_SPHERE: -1.00
OS_SPHERE: -0.25
OS_CYLINDER: SPH
OS_OVR_VA: 20/
OS_OVR_VA: 20/
OD_AXIS: 180
OS_VPRISM_DIRECTION: SV

## 2019-01-30 ASSESSMENT — VISUAL ACUITY
OS_BCVA: 20/30
OD_BCVA: 20/25-2

## 2019-01-30 ASSESSMENT — REFRACTION_AUTOREFRACTION
OD_CYLINDER: SPH
OD_SPHERE: -1.75
OS_SPHERE: -1.25
OS_CYLINDER: SPH

## 2019-01-30 ASSESSMENT — SPHEQUIV_DERIVED: OD_SPHEQUIV: -1.125

## 2019-02-13 ENCOUNTER — DOCTOR'S OFFICE (OUTPATIENT)
Dept: URBAN - METROPOLITAN AREA CLINIC 137 | Facility: CLINIC | Age: 59
Setting detail: OPHTHALMOLOGY
End: 2019-02-13
Payer: COMMERCIAL

## 2019-02-13 DIAGNOSIS — H04.123: ICD-10-CM

## 2019-02-13 DIAGNOSIS — H25.13: ICD-10-CM

## 2019-02-13 PROCEDURE — 92014 COMPRE OPH EXAM EST PT 1/>: CPT | Performed by: OPTOMETRIST

## 2019-02-13 ASSESSMENT — REFRACTION_MANIFEST
OU_VA: 20/
OS_VA3: 20/
OS_VA2: 20/
OD_AXIS: 140
OD_VA3: 20/
OD_CYLINDER: -0.25
OD_VA3: 20/
OS_ADD: +1.75
OS_VA1: 20/20
OS_SPHERE: -0.75
OU_VA: 20/
OD_VA1: 20/20
OS_VA3: 20/
OD_SPHERE: -1.00
OS_CYLINDER: SPH
OS_VA2: 20/20(J1+)
OD_VA2: 20/
OD_VA2: 20/20(J1+)
OS_VA1: 20/
OD_VA1: 20/
OD_ADD: +1.75

## 2019-02-13 ASSESSMENT — REFRACTION_CURRENTRX
OS_CYLINDER: SPH
OD_CYLINDER: +0.25
OD_OVR_VA: 20/
OS_OVR_VA: 20/
OD_SPHERE: -1.00
OS_OVR_VA: 20/
OD_VPRISM_DIRECTION: SV
OD_AXIS: 180
OS_VPRISM_DIRECTION: SV
OS_OVR_VA: 20/
OS_SPHERE: -0.25

## 2019-02-13 ASSESSMENT — REFRACTION_AUTOREFRACTION
OD_CYLINDER: SPH
OS_CYLINDER: SPH
OD_SPHERE: -1.75
OS_SPHERE: -1.25

## 2019-02-13 ASSESSMENT — VISUAL ACUITY
OS_BCVA: 20/40-1
OD_BCVA: 20/30-1

## 2019-02-13 ASSESSMENT — CONFRONTATIONAL VISUAL FIELD TEST (CVF)
OS_FINDINGS: FULL
OD_FINDINGS: FULL

## 2019-02-13 ASSESSMENT — SPHEQUIV_DERIVED: OD_SPHEQUIV: -1.125

## 2019-03-27 ENCOUNTER — HOSPITAL ENCOUNTER (EMERGENCY)
Facility: HOSPITAL | Age: 59
Discharge: HOME | End: 2019-03-27
Attending: EMERGENCY MEDICINE | Admitting: EMERGENCY MEDICINE
Payer: COMMERCIAL

## 2019-03-27 ENCOUNTER — APPOINTMENT (EMERGENCY)
Dept: RADIOLOGY | Facility: HOSPITAL | Age: 59
End: 2019-03-27
Payer: COMMERCIAL

## 2019-03-27 VITALS
SYSTOLIC BLOOD PRESSURE: 124 MMHG | DIASTOLIC BLOOD PRESSURE: 62 MMHG | HEART RATE: 74 BPM | OXYGEN SATURATION: 98 % | RESPIRATION RATE: 18 BRPM | WEIGHT: 194 LBS | TEMPERATURE: 97.6 F

## 2019-03-27 DIAGNOSIS — R07.9 CHEST PAIN: Primary | ICD-10-CM

## 2019-03-27 LAB
ALBUMIN SERPL BCP-MCNC: 4.3 G/DL (ref 3.5–5)
ALP SERPL-CCNC: 113 U/L (ref 46–116)
ALT SERPL W P-5'-P-CCNC: 28 U/L (ref 12–78)
ANION GAP SERPL CALCULATED.3IONS-SCNC: 9 MMOL/L (ref 4–13)
AST SERPL W P-5'-P-CCNC: 25 U/L (ref 5–45)
BASOPHILS # BLD AUTO: 0.08 THOUSANDS/ΜL (ref 0–0.1)
BASOPHILS NFR BLD AUTO: 1 % (ref 0–1)
BILIRUB SERPL-MCNC: 0.49 MG/DL (ref 0.2–1)
BUN SERPL-MCNC: 12 MG/DL (ref 5–25)
CALCIUM SERPL-MCNC: 9.9 MG/DL (ref 8.3–10.1)
CHLORIDE SERPL-SCNC: 105 MMOL/L (ref 100–108)
CO2 SERPL-SCNC: 29 MMOL/L (ref 21–32)
CREAT SERPL-MCNC: 0.81 MG/DL (ref 0.6–1.3)
EOSINOPHIL # BLD AUTO: 0.35 THOUSAND/ΜL (ref 0–0.61)
EOSINOPHIL NFR BLD AUTO: 5 % (ref 0–6)
ERYTHROCYTE [DISTWIDTH] IN BLOOD BY AUTOMATED COUNT: 11.9 % (ref 11.6–15.1)
GFR SERPL CREATININE-BSD FRML MDRD: 81 ML/MIN/1.73SQ M
GLUCOSE SERPL-MCNC: 87 MG/DL (ref 65–140)
HCT VFR BLD AUTO: 44.2 % (ref 34.8–46.1)
HGB BLD-MCNC: 14.4 G/DL (ref 11.5–15.4)
IMM GRANULOCYTES # BLD AUTO: 0.02 THOUSAND/UL (ref 0–0.2)
IMM GRANULOCYTES NFR BLD AUTO: 0 % (ref 0–2)
LYMPHOCYTES # BLD AUTO: 3.05 THOUSANDS/ΜL (ref 0.6–4.47)
LYMPHOCYTES NFR BLD AUTO: 42 % (ref 14–44)
MCH RBC QN AUTO: 29.7 PG (ref 26.8–34.3)
MCHC RBC AUTO-ENTMCNC: 32.6 G/DL (ref 31.4–37.4)
MCV RBC AUTO: 91 FL (ref 82–98)
MONOCYTES # BLD AUTO: 0.63 THOUSAND/ΜL (ref 0.17–1.22)
MONOCYTES NFR BLD AUTO: 9 % (ref 4–12)
NEUTROPHILS # BLD AUTO: 3.22 THOUSANDS/ΜL (ref 1.85–7.62)
NEUTS SEG NFR BLD AUTO: 43 % (ref 43–75)
NRBC BLD AUTO-RTO: 0 /100 WBCS
PLATELET # BLD AUTO: 280 THOUSANDS/UL (ref 149–390)
PMV BLD AUTO: 10.3 FL (ref 8.9–12.7)
POTASSIUM SERPL-SCNC: 4 MMOL/L (ref 3.5–5.3)
PROT SERPL-MCNC: 7.9 G/DL (ref 6.4–8.2)
RBC # BLD AUTO: 4.85 MILLION/UL (ref 3.81–5.12)
SODIUM SERPL-SCNC: 143 MMOL/L (ref 136–145)
TROPONIN I SERPL-MCNC: <0.02 NG/ML
TROPONIN I SERPL-MCNC: <0.02 NG/ML
WBC # BLD AUTO: 7.35 THOUSAND/UL (ref 4.31–10.16)

## 2019-03-27 PROCEDURE — 84484 ASSAY OF TROPONIN QUANT: CPT | Performed by: EMERGENCY MEDICINE

## 2019-03-27 PROCEDURE — 93005 ELECTROCARDIOGRAM TRACING: CPT

## 2019-03-27 PROCEDURE — 80053 COMPREHEN METABOLIC PANEL: CPT | Performed by: EMERGENCY MEDICINE

## 2019-03-27 PROCEDURE — 36415 COLL VENOUS BLD VENIPUNCTURE: CPT | Performed by: EMERGENCY MEDICINE

## 2019-03-27 PROCEDURE — 99285 EMERGENCY DEPT VISIT HI MDM: CPT

## 2019-03-27 PROCEDURE — 85025 COMPLETE CBC W/AUTO DIFF WBC: CPT | Performed by: EMERGENCY MEDICINE

## 2019-03-27 PROCEDURE — 71046 X-RAY EXAM CHEST 2 VIEWS: CPT

## 2019-03-27 NOTE — ED PROCEDURE NOTE
PROCEDURE  ECG 12 Lead Documentation  Date/Time: 3/27/2019 12:36 PM  Performed by: Jamee Ly DO  Authorized by: Jamee Ly DO     Indications / Diagnosis:  Chest pain  ECG reviewed by me, the ED Provider: yes    Patient location:  ED  Interpretation:     Interpretation: normal    Rate:     ECG rate assessment: normal    Rhythm:     Rhythm: sinus rhythm    Ectopy:     Ectopy: none    Conduction:     Conduction: normal    ST segments:     ST segments:  Normal  T waves:     T waves: normal           Jamee Ly DO  03/27/19 1237

## 2019-03-27 NOTE — ED PROVIDER NOTES
History  Chief Complaint   Patient presents with    Chest Pain     Pt states approx 20 minutes ago she began having left arm pain  Pt states that it radiates to her chest  Pt states she then felt pressure on her chest   pt also c/o nausea  Pt states the pain seems to be improving      55-year-old female with no past medical history presents to the emergency department with left-sided arm and chest pain  Patient states she was speaking with a friend in a non upsetting conversation when she developed achiness and tingling in her left arm which then became pressure in her left chest   She states she felt a little nauseous  No shortness of breath  No diaphoresis  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 and now she states the chest pressure has resolved but she still has the left arm tingling  No prior history of similar episodes  Patient states that I just buried my mother yesterday  She believes this may be stress related        History provided by:  Patient   used: No    Chest Pain   Pain location:  L chest  Pain quality: tightness    Pain radiates to:  Does not radiate  Pain radiates to the back: no    Pain severity:  Unable to specify  Onset quality:  Sudden  Duration:  20 minutes  Progression:  Resolved  Chronicity:  New  Context: at rest and stress    Context: not breathing, not lifting, no movement and not raising an arm    Relieved by:  Oxygen  Worsened by:  Nothing tried  Ineffective treatments:  None tried  Associated symptoms: no abdominal pain, no altered mental status, no anorexia, no anxiety, no back pain, no claudication, no cough, no diaphoresis, no dizziness, no dysphagia, no fatigue, no fever, no headache, no heartburn, no lower extremity edema, no nausea, no near-syncope, no numbness, no orthopnea, no palpitations, no PND, no shortness of breath, no syncope, not vomiting and no weakness    Risk factors: no coronary artery disease, no diabetes mellitus, no high cholesterol, no hypertension, no immobilization, not obese, no prior DVT/PE, no smoking and no surgery        Prior to Admission Medications   Prescriptions Last Dose Informant Patient Reported? Taking? Calcium-Vitamin D-Vitamin K (VIACTIV) [phone] MG-UNT-MCG CHEW  Self Yes Yes   Sig: Chew 1 tablet daily   Multiple Vitamin (MULTIVITAMIN) tablet  Self Yes No   Sig: Take 1 tablet by mouth daily   venlafaxine (EFFEXOR-XR) 75 mg 24 hr capsule  Self Yes Yes   Sig: Take 75 mg by mouth daily      Facility-Administered Medications: None       History reviewed  No pertinent past medical history  Past Surgical History:   Procedure Laterality Date    APPENDECTOMY      CHOLECYSTECTOMY      HYSTERECTOMY      NECK SURGERY         History reviewed  No pertinent family history  I have reviewed and agree with the history as documented  Social History     Tobacco Use    Smoking status: Never Smoker    Smokeless tobacco: Never Used   Substance Use Topics    Alcohol use: No    Drug use: No        Review of Systems   Constitutional: Negative  Negative for chills, diaphoresis, fatigue and fever  HENT: Negative  Negative for congestion, rhinorrhea, sore throat and trouble swallowing  Eyes: Negative  Negative for discharge, redness and itching  Respiratory: Negative  Negative for apnea, cough, chest tightness, shortness of breath and wheezing  Cardiovascular: Positive for chest pain  Negative for palpitations, orthopnea, claudication, leg swelling, syncope, PND and near-syncope  Gastrointestinal: Negative  Negative for abdominal pain, anorexia, heartburn, nausea and vomiting  Endocrine: Negative  Genitourinary: Negative  Negative for flank pain, frequency and urgency  Musculoskeletal: Negative  Negative for back pain  Skin: Negative  Allergic/Immunologic: Negative  Neurological: Negative  Negative for dizziness, syncope, weakness, light-headedness, numbness and headaches  Hematological: Negative  All other systems reviewed and are negative  Physical Exam  Physical Exam   Constitutional: She is oriented to person, place, and time  She appears well-developed and well-nourished  Non-toxic appearance  She does not have a sickly appearance  She does not appear ill  No distress  HENT:   Head: Normocephalic and atraumatic  Right Ear: External ear normal    Left Ear: External ear normal    Mouth/Throat: Oropharynx is clear and moist    Eyes: Pupils are equal, round, and reactive to light  Conjunctivae are normal  Right eye exhibits no discharge  Left eye exhibits no discharge  No scleral icterus  Neck: Normal range of motion  Neck supple  Cardiovascular: Normal rate, regular rhythm and normal heart sounds  Exam reveals no gallop and no friction rub  No murmur heard  Pulmonary/Chest: Effort normal and breath sounds normal  No stridor  No respiratory distress  She has no wheezes  She has no rales  She exhibits no tenderness  Abdominal: Soft  Bowel sounds are normal  She exhibits no distension and no mass  There is no tenderness  No hernia  Musculoskeletal: Normal range of motion  She exhibits no edema, tenderness or deformity  Neurological: She is alert and oriented to person, place, and time  She has normal reflexes  She displays normal reflexes  She exhibits normal muscle tone  Skin: Skin is warm and dry  No rash noted  She is not diaphoretic  No erythema  No pallor  Psychiatric: She has a normal mood and affect  Nursing note and vitals reviewed        Vital Signs  ED Triage Vitals   Temperature Pulse Respirations Blood Pressure SpO2   03/27/19 1231 03/27/19 1215 03/27/19 1215 03/27/19 1216 03/27/19 1215   98 3 °F (36 8 °C) 96 18 134/64 98 %      Temp Source Heart Rate Source Patient Position - Orthostatic VS BP Location FiO2 (%)   03/27/19 1231 03/27/19 1215 03/27/19 1215 03/27/19 1215 --   Oral Monitor Sitting Right arm       Pain Score       03/27/19 1400 4           Vitals:    03/27/19 1215 03/27/19 1216 03/27/19 1400 03/27/19 1504   BP:  134/64 118/66 119/64   Pulse: 96  64 77   Patient Position - Orthostatic VS: Sitting  Lying Lying         Visual Acuity      ED Medications  Medications - No data to display    Diagnostic Studies  Results Reviewed     Procedure Component Value Units Date/Time    Troponin I [[id number]]  (Normal) Collected:  03/27/19 1528    Lab Status:  Final result Specimen:  Blood from Arm, Right Updated:  03/27/19 1602     Troponin I <0 02 ng/mL     Troponin I [[id number]]  (Normal) Collected:  03/27/19 1225    Lab Status:  Final result Specimen:  Blood from Arm, Right Updated:  03/27/19 1247     Troponin I <0 02 ng/mL     Comprehensive metabolic panel [[id number]] Collected:  03/27/19 1225    Lab Status:  Final result Specimen:  Blood from Arm, Right Updated:  03/27/19 1244     Sodium 143 mmol/L      Potassium 4 0 mmol/L      Chloride 105 mmol/L      CO2 29 mmol/L      ANION GAP 9 mmol/L      BUN 12 mg/dL      Creatinine 0 81 mg/dL      Glucose 87 mg/dL      Calcium 9 9 mg/dL      AST 25 U/L      ALT 28 U/L      Alkaline Phosphatase 113 U/L      Total Protein 7 9 g/dL      Albumin 4 3 g/dL      Total Bilirubin 0 49 mg/dL      eGFR 81 ml/min/1 73sq m     Narrative:       National Kidney Disease Education Program recommendations are as follows:  GFR calculation is accurate only with a steady state creatinine  Chronic Kidney disease less than 60 ml/min/1 73 sq  meters  Kidney failure less than 15 ml/min/1 73 sq  meters      CBC and differential [[id number]] Collected:  03/27/19 1225    Lab Status:  Final result Specimen:  Blood from Arm, Right Updated:  03/27/19 1232     WBC 7 35 Thousand/uL      RBC 4 85 Million/uL      Hemoglobin 14 4 g/dL      Hematocrit 44 2 %      MCV 91 fL      MCH 29 7 pg      MCHC 32 6 g/dL      RDW 11 9 %      MPV 10 3 fL      Platelets 229 Thousands/uL      nRBC 0 /100 WBCs      Neutrophils Relative 43 %      Immat GRANS % 0 %      Lymphocytes Relative 42 %      Monocytes Relative 9 %      Eosinophils Relative 5 %      Basophils Relative 1 %      Neutrophils Absolute 3 22 Thousands/µL      Immature Grans Absolute 0 02 Thousand/uL      Lymphocytes Absolute 3 05 Thousands/µL      Monocytes Absolute 0 63 Thousand/µL      Eosinophils Absolute 0 35 Thousand/µL      Basophils Absolute 0 08 Thousands/µL                  XR chest 2 views   ED Interpretation by Kyle Moseley DO (03/27 1357)   nad      Final Result by Ralph Blancas MD (03/27 1525)      No acute cardiopulmonary disease  Workstation performed: [id number]                    Procedures  Procedures       Phone Contacts  ED Phone Contact    ED Course         HEART Risk Score      Most Recent Value   History  0 Filed at: 03/27/2019 1306   ECG  0 Filed at: 03/27/2019 1306   Age  1 Filed at: 03/27/2019 1306   Risk Factors  0 Filed at: 03/27/2019 1306   Troponin  0 Filed at: 03/27/2019 1306   Heart Score Risk Calculator   History  0 Filed at: 03/27/2019 1306   ECG  0 Filed at: 03/27/2019 1306   Age  1 Filed at: 03/27/2019 1306   Risk Factors  0 Filed at: 03/27/2019 1306   Troponin  0 Filed at: 03/27/2019 1306   HEART Score  1 Filed at: 03/27/2019 1306   HEART Score  1 Filed at: 03/27/2019 1306                            MDM  Number of Diagnoses or Management Options  Diagnosis management comments: 72-year-old female presents with left-sided chest tightness while at rest   She was having a discussion with her friend at work but the conversation was not upsetting  She did say that she may buried her mother yesterday but did not feel particularly stressed today  No diaphoresis or shortness of breath associated with the discomfort  She was placed on oxygen at the school and now states the tightness in her chest has gone away but still has some tingling in her left arm    Given her age as her only risk factor will do cardiac workup, delta troponin and EKG in 3 hours  Patient is in agreement with the plan  Amount and/or Complexity of Data Reviewed  Clinical lab tests: ordered and reviewed  Tests in the radiology section of CPT®: ordered and reviewed  Independent visualization of images, tracings, or specimens: yes        Disposition  Final diagnoses:   Chest pain     Time reflects when diagnosis was documented in both MDM as applicable and the Disposition within this note     Time User Action Codes Description Comment    3/27/2019  4:03 PM Denicemac Epi A Add [R07 9] Chest pain       ED Disposition     ED Disposition Condition Date/Time Comment    Discharge Good Wed Mar 27, 2019  4:03 PM Lucía Mishra discharge to home/self care  Follow-up Information     Follow up With Specialties Details Why 12 Ward Rojas MD Internal Medicine Schedule an appointment as soon as possible for a visit in 2 days If symptoms worsen or return to the emergency department [address] [place] [address]  [phone]            Patient's Medications   Discharge Prescriptions    No medications on file     Outpatient Discharge Orders   Stress test only, exercise   Standing Status: Future Standing Exp   Date: 03/27/23       ED Provider  Electronically Signed by           Ling Mcfadden DO  03/27/19 6825

## 2019-03-27 NOTE — ED PROCEDURE NOTE
PROCEDURE  ECG 12 Lead Documentation  Date/Time: 3/27/2019 3:30 PM  Performed by: Zeferino Holt DO  Authorized by: Zeferino Holt DO     Indications / Diagnosis:  Delta EKG  ECG reviewed by me, the ED Provider: yes    Patient location:  ED  Interpretation:     Interpretation: normal    Rate:     ECG rate assessment: normal    Rhythm:     Rhythm: sinus rhythm    Ectopy:     Ectopy: none    Conduction:     Conduction: normal    ST segments:     ST segments:  Normal  T waves:     T waves: normal           Zeferino Holt DO  03/27/19 1530

## 2019-03-29 LAB
ATRIAL RATE: 64 BPM
ATRIAL RATE: 72 BPM
P AXIS: 64 DEGREES
P AXIS: 73 DEGREES
PR INTERVAL: 156 MS
PR INTERVAL: 170 MS
QRS AXIS: 20 DEGREES
QRS AXIS: 59 DEGREES
QRSD INTERVAL: 84 MS
QRSD INTERVAL: 84 MS
QT INTERVAL: 404 MS
QT INTERVAL: 410 MS
QTC INTERVAL: 422 MS
QTC INTERVAL: 442 MS
T WAVE AXIS: 71 DEGREES
T WAVE AXIS: 74 DEGREES
VENTRICULAR RATE: 64 BPM
VENTRICULAR RATE: 72 BPM

## 2019-03-29 PROCEDURE — 93010 ELECTROCARDIOGRAM REPORT: CPT | Performed by: INTERNAL MEDICINE

## 2019-04-02 ENCOUNTER — HOSPITAL ENCOUNTER (OUTPATIENT)
Dept: NON INVASIVE DIAGNOSTICS | Facility: CLINIC | Age: 59
Discharge: HOME | End: 2019-04-02
Payer: COMMERCIAL

## 2019-04-02 DIAGNOSIS — R07.9 CHEST PAIN: ICD-10-CM

## 2019-04-02 PROCEDURE — 93018 CV STRESS TEST I&R ONLY: CPT | Performed by: INTERNAL MEDICINE

## 2019-04-02 PROCEDURE — 93017 CV STRESS TEST TRACING ONLY: CPT

## 2019-04-02 PROCEDURE — 93016 CV STRESS TEST SUPVJ ONLY: CPT | Performed by: INTERNAL MEDICINE

## 2019-04-03 LAB
CHEST PAIN STATEMENT: NORMAL
MAX DIASTOLIC BP: 72 MMHG
MAX HEART RATE: 144 BPM
MAX PREDICTED HEART RATE: 163 BPM
MAX. SYSTOLIC BP: 168 MMHG
PROTOCOL NAME: NORMAL
REASON FOR TERMINATION: NORMAL
TARGET HR FORMULA: NORMAL
TEST INDICATION: NORMAL
TIME IN EXERCISE PHASE: NORMAL

## 2021-02-13 ENCOUNTER — APPOINTMENT (EMERGENCY)
Dept: CT IMAGING | Facility: HOSPITAL | Age: 61
End: 2021-02-13
Payer: COMMERCIAL

## 2021-02-13 ENCOUNTER — HOSPITAL ENCOUNTER (EMERGENCY)
Facility: HOSPITAL | Age: 61
Discharge: HOME | End: 2021-02-13
Attending: EMERGENCY MEDICINE | Admitting: EMERGENCY MEDICINE
Payer: COMMERCIAL

## 2021-02-13 VITALS
OXYGEN SATURATION: 98 % | TEMPERATURE: 97.9 F | WEIGHT: 200 LBS | RESPIRATION RATE: 16 BRPM | HEART RATE: 72 BPM | SYSTOLIC BLOOD PRESSURE: 132 MMHG | DIASTOLIC BLOOD PRESSURE: 72 MMHG

## 2021-02-13 DIAGNOSIS — K59.00 CONSTIPATION, UNSPECIFIED CONSTIPATION TYPE: Primary | ICD-10-CM

## 2021-02-13 LAB
ALBUMIN SERPL BCP-MCNC: 4.4 G/DL (ref 3.5–5)
ALP SERPL-CCNC: 100 U/L (ref 46–116)
ALT SERPL W P-5'-P-CCNC: 28 U/L (ref 12–78)
ANION GAP SERPL CALCULATED.3IONS-SCNC: 9 MMOL/L (ref 4–13)
AST SERPL W P-5'-P-CCNC: 17 U/L (ref 5–45)
BASOPHILS # BLD AUTO: 0.07 THOUSANDS/ΜL (ref 0–0.1)
BASOPHILS NFR BLD AUTO: 1 % (ref 0–1)
BILIRUB SERPL-MCNC: 0.46 MG/DL (ref 0.2–1)
BILIRUB UR QL STRIP: NEGATIVE
BUN SERPL-MCNC: 17 MG/DL (ref 5–25)
CALCIUM SERPL-MCNC: 9 MG/DL (ref 8.3–10.1)
CHLORIDE SERPL-SCNC: 106 MMOL/L (ref 100–108)
CLARITY UR: CLEAR
CO2 SERPL-SCNC: 26 MMOL/L (ref 21–32)
COLOR UR: YELLOW
CREAT SERPL-MCNC: 1.06 MG/DL (ref 0.6–1.3)
EOSINOPHIL # BLD AUTO: 0.19 THOUSAND/ΜL (ref 0–0.61)
EOSINOPHIL NFR BLD AUTO: 2 % (ref 0–6)
ERYTHROCYTE [DISTWIDTH] IN BLOOD BY AUTOMATED COUNT: 11.9 % (ref 11.6–15.1)
GFR SERPL CREATININE-BSD FRML MDRD: 58 ML/MIN/1.73SQ M
GLUCOSE SERPL-MCNC: 91 MG/DL (ref 65–140)
GLUCOSE UR STRIP-MCNC: NEGATIVE MG/DL
HCT VFR BLD AUTO: 40.9 % (ref 34.8–46.1)
HGB BLD-MCNC: 13.3 G/DL (ref 11.5–15.4)
HGB UR QL STRIP.AUTO: NEGATIVE
IMM GRANULOCYTES # BLD AUTO: 0.04 THOUSAND/UL (ref 0–0.2)
IMM GRANULOCYTES NFR BLD AUTO: 0 % (ref 0–2)
KETONES UR STRIP-MCNC: NEGATIVE MG/DL
LEUKOCYTE ESTERASE UR QL STRIP: NEGATIVE
LIPASE SERPL-CCNC: 132 U/L (ref 73–393)
LYMPHOCYTES # BLD AUTO: 2.98 THOUSANDS/ΜL (ref 0.6–4.47)
LYMPHOCYTES NFR BLD AUTO: 28 % (ref 14–44)
MCH RBC QN AUTO: 29.2 PG (ref 26.8–34.3)
MCHC RBC AUTO-ENTMCNC: 32.5 G/DL (ref 31.4–37.4)
MCV RBC AUTO: 90 FL (ref 82–98)
MONOCYTES # BLD AUTO: 0.76 THOUSAND/ΜL (ref 0.17–1.22)
MONOCYTES NFR BLD AUTO: 7 % (ref 4–12)
NEUTROPHILS # BLD AUTO: 6.76 THOUSANDS/ΜL (ref 1.85–7.62)
NEUTS SEG NFR BLD AUTO: 62 % (ref 43–75)
NITRITE UR QL STRIP: NEGATIVE
NRBC BLD AUTO-RTO: 0 /100 WBCS
PH UR STRIP.AUTO: 6 [PH] (ref 4.5–8)
PLATELET # BLD AUTO: 263 THOUSANDS/UL (ref 149–390)
PMV BLD AUTO: 10.5 FL (ref 8.9–12.7)
POTASSIUM SERPL-SCNC: 3.5 MMOL/L (ref 3.5–5.3)
PROT SERPL-MCNC: 7.3 G/DL (ref 6.4–8.2)
PROT UR STRIP-MCNC: NEGATIVE MG/DL
RBC # BLD AUTO: 4.55 MILLION/UL (ref 3.81–5.12)
SODIUM SERPL-SCNC: 141 MMOL/L (ref 136–145)
SP GR UR STRIP.AUTO: <=1.005 (ref 1–1.03)
UROBILINOGEN UR QL STRIP.AUTO: 0.2 E.U./DL
WBC # BLD AUTO: 10.8 THOUSAND/UL (ref 4.31–10.16)

## 2021-02-13 PROCEDURE — 80053 COMPREHEN METABOLIC PANEL: CPT | Performed by: PHYSICIAN ASSISTANT

## 2021-02-13 PROCEDURE — 36415 COLL VENOUS BLD VENIPUNCTURE: CPT | Performed by: PHYSICIAN ASSISTANT

## 2021-02-13 PROCEDURE — 74177 CT ABD & PELVIS W/CONTRAST: CPT

## 2021-02-13 PROCEDURE — G1004 CDSM NDSC: HCPCS

## 2021-02-13 PROCEDURE — 83690 ASSAY OF LIPASE: CPT | Performed by: PHYSICIAN ASSISTANT

## 2021-02-13 PROCEDURE — 99284 EMERGENCY DEPT VISIT MOD MDM: CPT | Performed by: PHYSICIAN ASSISTANT

## 2021-02-13 PROCEDURE — 85025 COMPLETE CBC W/AUTO DIFF WBC: CPT | Performed by: PHYSICIAN ASSISTANT

## 2021-02-13 PROCEDURE — 99284 EMERGENCY DEPT VISIT MOD MDM: CPT

## 2021-02-13 PROCEDURE — 81003 URINALYSIS AUTO W/O SCOPE: CPT

## 2021-02-13 RX ORDER — MAGNESIUM CARB/ALUMINUM HYDROX 105-160MG
296 TABLET,CHEWABLE ORAL ONCE
Status: COMPLETED | OUTPATIENT
Start: 2021-02-13 | End: 2021-02-13

## 2021-02-13 RX ADMIN — IOHEXOL 100 ML: 350 INJECTION, SOLUTION INTRAVENOUS at 13:39

## 2021-02-13 RX ADMIN — IOHEXOL 50 ML: 240 INJECTION, SOLUTION INTRATHECAL; INTRAVASCULAR; INTRAVENOUS; ORAL at 12:08

## 2021-02-13 RX ADMIN — MAGNESIUM CITRATE 296 ML: 1.75 LIQUID ORAL at 14:50

## 2021-02-13 NOTE — ED PROVIDER NOTES
History  Chief Complaint   Patient presents with    Constipation     per pt "she has been struggling jhaving a bowel movement has not gone all this week she did fleet this morning and didn't go that much and she threw up,per pt  the last time this happened she had  a blockage  "     81-year-old female presents the emergency department with complaints of constipation  States she has been having symptoms over the past 2 weeks  Reports her last normal bowel movement was 2 weeks ago  Notes she went 7 days this without a bowel movement after that and she last had a bowel movement 6 days ago after use of stool softeners and an enema  States that initial stool was hard followed by some more liquidy stool  Reports she did have 1 episode of vomiting with administration of a Fleet's enema 6 days ago as well as this morning  States that today she was unable to have a bowel movement after use of the enema  She has not had any fevers  Does complain of some mild abdominal discomfort in epigastric and lower parts of the abdomen  No fevers  History of previous bowel obstruction with similar symptoms  States that prior to constipation starting she has started eating healthier with more fruits and vegetables  History provided by:  Patient   used: No    Constipation  Severity:  Moderate  Time since last bowel movement:  6 days  Progression:  Worsening  Chronicity:  New  Context: dietary changes    Stool description:  None produced  Relieved by:  Nothing  Ineffective treatments:  Miralax and enemas  Associated symptoms: abdominal pain and vomiting    Associated symptoms: no diarrhea, no dysuria, no fever and no nausea        Prior to Admission Medications   Prescriptions Last Dose Informant Patient Reported?  Taking?   venlafaxine (EFFEXOR-XR) 75 mg 24 hr capsule 2/12/2021 at Unknown time Self Yes Yes   Sig: Take 75 mg by mouth daily      Facility-Administered Medications: None       Past Medical History:   Diagnosis Date    Anxiety     Back pain     Depression     Eye problem 12/2015    both eyelids- surgery done     Gout     Hyperlipidemia     IBS (irritable bowel syndrome)     Lichen planus     of the mouth     Neck problem        Past Surgical History:   Procedure Laterality Date    APPENDECTOMY  2000    CHOLECYSTECTOMY  2002    HYSTERECTOMY  2000    NECK SURGERY  1996    Neck spine fusion C4, C5, C6  has a metal plate       Family History   Problem Relation Age of Onset    Diabetes Mother     Hypertension Father     Parkinsonism Father      I have reviewed and agree with the history as documented  E-Cigarette/Vaping     E-Cigarette/Vaping Substances     Social History     Tobacco Use    Smoking status: Never Smoker    Smokeless tobacco: Never Used   Substance Use Topics    Alcohol use: No    Drug use: No       Review of Systems   Constitutional: Negative for activity change, appetite change, chills and fever  HENT: Negative for congestion, dental problem, drooling, ear discharge, ear pain, mouth sores, nosebleeds, rhinorrhea, sore throat and trouble swallowing  Eyes: Negative for pain, discharge and itching  Respiratory: Negative for cough, chest tightness, shortness of breath and wheezing  Cardiovascular: Negative for chest pain and palpitations  Gastrointestinal: Positive for abdominal pain, constipation and vomiting  Negative for blood in stool, diarrhea and nausea  Endocrine: Negative for cold intolerance and heat intolerance  Genitourinary: Negative for difficulty urinating, dysuria, flank pain, frequency and urgency  Skin: Negative for rash and wound  Allergic/Immunologic: Negative for food allergies and immunocompromised state  Neurological: Negative for dizziness, seizures, syncope, weakness, numbness and headaches  Psychiatric/Behavioral: Negative for agitation, behavioral problems and confusion         Physical Exam  Physical Exam  Vitals signs and nursing note reviewed  Constitutional:       General: She is not in acute distress  Appearance: She is not diaphoretic  HENT:      Head: Normocephalic and atraumatic  Right Ear: External ear normal       Left Ear: External ear normal       Mouth/Throat:      Pharynx: No oropharyngeal exudate  Eyes:      Conjunctiva/sclera: Conjunctivae normal    Neck:      Vascular: No JVD  Trachea: No tracheal deviation  Cardiovascular:      Rate and Rhythm: Normal rate and regular rhythm  Heart sounds: Normal heart sounds  No murmur  No friction rub  No gallop  Pulmonary:      Effort: Pulmonary effort is normal  No respiratory distress  Breath sounds: Normal breath sounds  No wheezing or rales  Chest:      Chest wall: No tenderness  Abdominal:      General: Bowel sounds are normal  There is no distension  Palpations: Abdomen is soft  Tenderness: There is abdominal tenderness in the right lower quadrant, epigastric area and left lower quadrant  There is no right CVA tenderness, left CVA tenderness or guarding  Genitourinary:     Rectum: External hemorrhoid present  No anal fissure  Comments: No fecal impaction  Musculoskeletal: Normal range of motion  General: No tenderness or deformity  Lymphadenopathy:      Cervical: No cervical adenopathy  Skin:     General: Skin is warm and dry  Findings: No erythema or rash  Neurological:      Mental Status: She is alert and oriented to person, place, and time     Psychiatric:         Mood and Affect: Mood normal          Behavior: Behavior normal          Vital Signs  ED Triage Vitals [02/13/21 1128]   Temperature Pulse Respirations Blood Pressure SpO2   97 9 °F (36 6 °C) 69 18 125/68 93 %      Temp Source Heart Rate Source Patient Position - Orthostatic VS BP Location FiO2 (%)   Oral Monitor Lying Right arm --      Pain Score       2           Vitals:    02/13/21 1128 02/13/21 1406   BP: 125/68 132/72   Pulse: 69 72 Patient Position - Orthostatic VS: Lying Sitting         Visual Acuity      ED Medications  Medications   magnesium citrate (CITROMA) oral solution 296 mL (has no administration in time range)   iohexol (OMNIPAQUE) 240 MG/ML solution 50 mL (50 mL Oral Given 2/13/21 1208)   iohexol (OMNIPAQUE) 350 MG/ML injection (MULTI-DOSE) 100 mL (100 mL Intravenous Given 2/13/21 1339)       Diagnostic Studies  Results Reviewed     Procedure Component Value Units Date/Time    Urine Macroscopic, POC [[id number]] Collected: 02/13/21 1335    Lab Status: Final result Specimen: Urine Updated: 02/13/21 1336     Color, UA Yellow     Clarity, UA Clear     pH, UA 6 0     Leukocytes, UA Negative     Nitrite, UA Negative     Protein, UA Negative mg/dl      Glucose, UA Negative mg/dl      Ketones, UA Negative mg/dl      Urobilinogen, UA 0 2 E U /dl      Bilirubin, UA Negative     Blood, UA Negative     Specific Gravity, UA <=1 005    Narrative:      [facility] RESULT    Comprehensive metabolic panel [[id number]] Collected: 02/13/21 1208    Lab Status: Final result Specimen: Blood from Arm, Right Updated: 02/13/21 1238     Sodium 141 mmol/L      Potassium 3 5 mmol/L      Chloride 106 mmol/L      CO2 26 mmol/L      ANION GAP 9 mmol/L      BUN 17 mg/dL      Creatinine 1 06 mg/dL      Glucose 91 mg/dL      Calcium 9 0 mg/dL      AST 17 U/L      ALT 28 U/L      Alkaline Phosphatase 100 U/L      Total Protein 7 3 g/dL      Albumin 4 4 g/dL      Total Bilirubin 0 46 mg/dL      eGFR 58 ml/min/1 73sq m     Narrative:      [facility] guidelines for Chronic Kidney Disease (CKD):     Stage 1 with normal or high GFR (GFR > 90 mL/min/1 73 square meters)    Stage 2 Mild CKD (GFR = 60-89 mL/min/1 73 square meters)    Stage 3A Moderate CKD (GFR = 45-59 mL/min/1 73 square meters)    Stage 3B Moderate CKD (GFR = 30-44 mL/min/1 73 square meters)    Stage 4 Severe CKD (GFR = 15-29 mL/min/1 73 square meters)    Stage 5 End Stage CKD (GFR <15 mL/min/1 73 square meters)  Note: GFR calculation is accurate only with a steady state creatinine    Lipase [[id number]]  (Normal) Collected: 02/13/21 1208    Lab Status: Final result Specimen: Blood from Arm, Right Updated: 02/13/21 1238     Lipase 132 u/L     CBC and differential [[id number]]  (Abnormal) Collected: 02/13/21 1208    Lab Status: Final result Specimen: Blood from Arm, Right Updated: 02/13/21 1223     WBC 10 80 Thousand/uL      RBC 4 55 Million/uL      Hemoglobin 13 3 g/dL      Hematocrit 40 9 %      MCV 90 fL      MCH 29 2 pg      MCHC 32 5 g/dL      RDW 11 9 %      MPV 10 5 fL      Platelets 440 Thousands/uL      nRBC 0 /100 WBCs      Neutrophils Relative 62 %      Immat GRANS % 0 %      Lymphocytes Relative 28 %      Monocytes Relative 7 %      Eosinophils Relative 2 %      Basophils Relative 1 %      Neutrophils Absolute 6 76 Thousands/µL      Immature Grans Absolute 0 04 Thousand/uL      Lymphocytes Absolute 2 98 Thousands/µL      Monocytes Absolute 0 76 Thousand/µL      Eosinophils Absolute 0 19 Thousand/µL      Basophils Absolute 0 07 Thousands/µL                  CT abdomen pelvis with contrast   Final Result by Jian Miller MD (02/13 1410)      No acute abnormality identified  Stable hepatic cyst   Interval decrease in prior dominant left renal cortical cyst       Prior cholecystectomy              Workstation performed: [id number]                    Procedures  Procedures         ED Course                                           MDM  Number of Diagnoses or Management Options  Constipation, unspecified constipation type:   Diagnosis management comments: Differential diagnosis includes but not limited to:  Constipation, fecal impaction, partial bowel obstruction, small-bowel obstruction, ileus       Amount and/or Complexity of Data Reviewed  Clinical lab tests: ordered and reviewed  Tests in the radiology section of CPT®: ordered and reviewed        Disposition  Final diagnoses:   Constipation, unspecified constipation type     Time reflects when diagnosis was documented in both MDM as applicable and the Disposition within this note     Time User Action Codes Description Comment    2/13/2021  2:43 PM Washington Rodriguez 26 [K59 00] Constipation, unspecified constipation type       ED Disposition     ED Disposition Condition Date/Time Comment    Discharge Stable Sat Feb 13, 2021  2:43 PM Sanchez Moreno discharge to home/self care  Follow-up Information     Follow up With Specialties Details Why [address] Ward Rojas MD Internal Medicine   [address] [address]  [id number]  [phone]            Patient's Medications   Discharge Prescriptions    No medications on file     No discharge procedures on file      PDMP Review     None          ED Provider  Electronically Signed by           Jeremiah Ferro PA-C  02/13/21 2350

## 2021-03-10 DIAGNOSIS — Z23 ENCOUNTER FOR IMMUNIZATION: ICD-10-CM

## 2021-03-18 ENCOUNTER — IMMUNIZATIONS (OUTPATIENT)
Dept: FAMILY MEDICINE CLINIC | Facility: HOSPITAL | Age: 61
End: 2021-03-18

## 2021-03-18 DIAGNOSIS — Z23 ENCOUNTER FOR IMMUNIZATION: Primary | ICD-10-CM

## 2021-03-18 PROCEDURE — 91300 SARS-COV-2 / COVID-19 MRNA VACCINE (PFIZER-BIONTECH) 30 MCG: CPT

## 2021-03-18 PROCEDURE — 0001A SARS-COV-2 / COVID-19 MRNA VACCINE (PFIZER-BIONTECH) 30 MCG: CPT

## 2021-04-09 ENCOUNTER — IMMUNIZATIONS (OUTPATIENT)
Dept: FAMILY MEDICINE CLINIC | Facility: HOSPITAL | Age: 61
End: 2021-04-09

## 2021-04-09 DIAGNOSIS — Z23 ENCOUNTER FOR IMMUNIZATION: Primary | ICD-10-CM

## 2021-04-09 PROCEDURE — 91300 SARS-COV-2 / COVID-19 MRNA VACCINE (PFIZER-BIONTECH) 30 MCG: CPT

## 2021-04-09 PROCEDURE — 0002A SARS-COV-2 / COVID-19 MRNA VACCINE (PFIZER-BIONTECH) 30 MCG: CPT

## 2024-02-28 ENCOUNTER — OFFICE VISIT (OUTPATIENT)
Dept: PHYSICAL THERAPY | Facility: MEDICAL CENTER | Age: 64
End: 2024-02-28
Payer: COMMERCIAL

## 2024-02-28 DIAGNOSIS — M92.61 HAGLUND'S DEFORMITY OF RIGHT HEEL: Primary | ICD-10-CM

## 2024-02-28 PROCEDURE — 97161 PT EVAL LOW COMPLEX 20 MIN: CPT | Performed by: PHYSICAL THERAPIST

## 2024-02-28 NOTE — PROGRESS NOTES
PT Evaluation     Today's date: 2024  Patient name: Elizabeth Chaudhari  : 1960  MRN: [id number]  Referring provider: Daovn Saldivar, PT  Dx:   Encounter Diagnosis     ICD-10-CM    1. 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 female was evaluated on 2024  for Marcos's deformity of right heel  (primary encounter diagnosis). Elizabeth Chaudhari has the above listed impairments resulting in functional deficits and negative impact to quality of life.  Patient is appropriate for skilled PT intervention to promote maximal return to function and patient specific goals.      Patient agrees with outlined treatment plan and all questions were answered to their satisfaction.       Impairments: abnormal muscle firing, abnormal muscle tone, abnormal or restricted ROM, impaired physical strength, lacks appropriate home exercise program and pain with function  Understanding of Dx/Px/POC: good   Prognosis: good    Goals  Patient will successfully transition to home exercise program.  Patient will be able to manage symptoms independently.    Elizabeth Chaudhari will report 50% reduction in pain during walking in 4 weeks   Elizabeth will return to pickleball       Plan  Patient would benefit from: skilled PT  Referral necessary: No  Planned modality interventions: thermotherapy: hydrocollator packs  Planned therapy interventions: home exercise program, manual therapy, neuromuscular re-education, patient education, functional ROM exercises, strengthening, stretching, joint mobilization, graded activity, graded exercise, therapeutic exercise, body mechanics training, motor coordination training and activity modification  Frequency: 2x week  Duration in weeks: 12  Treatment plan discussed with: patient        Subjective Evaluation    History of Present Illness  Mechanism of injury: Elizabeth Chaudhari is a 63 y.o. female presenting to therapy with complaints of right heel  pain.  She was very active in pickleball, felt a pain during playing one day in May of last year and then has been dealing with the pain since.  She notes prior therapy which has helped temporarily and only marginally.   She had radiographs indicating vashti deformity with notable thickening in the tendon.   She is hoping to avoid invasive procedures, referred to our clinic for trial of EPAT   Patient Goals  Patient goals for therapy: decreased pain, increased motion, return to work, increased strength, return to sport/leisure activities and independence with ADLs/IADLs    Pain  Current pain ratin  At best pain ratin  At worst pain ratin  Quality: dull ache, tight and pressure          Objective     Observations     Additional Observation Details  Thickening of R achilles, notable spurring     Tenderness     Right Ankle/Foot   Tenderness in the Achilles insertion.     Active Range of Motion   Left Ankle/Foot   Normal active range of motion    Right Ankle/Foot   Normal active range of motion    Joint Play     Right Ankle/Foot  Hypomobile in the talocrural joint.     Additional Joint Play Details  Limited calcaneal eversion     Strength/Myotome Testing     Left Ankle/Foot   Normal strength    Right Ankle/Foot   Normal strength             Precautions: None      Manuals                                                                 Neuro Re-Ed                                                                                                        Ther Ex                                                                                                                     Ther Activity                                       Gait Training                                       Modalities                                             yes

## 2024-03-12 ENCOUNTER — OFFICE VISIT (OUTPATIENT)
Dept: PHYSICAL THERAPY | Facility: MEDICAL CENTER | Age: 64
End: 2024-03-12
Payer: COMMERCIAL

## 2024-03-12 DIAGNOSIS — M92.61 HAGLUND'S DEFORMITY OF RIGHT HEEL: Primary | ICD-10-CM

## 2024-03-12 PROCEDURE — 97110 THERAPEUTIC EXERCISES: CPT | Performed by: PHYSICAL THERAPIST

## 2024-03-12 PROCEDURE — 97140 MANUAL THERAPY 1/> REGIONS: CPT | Performed by: PHYSICAL THERAPIST

## 2024-03-12 NOTE — PROGRESS NOTES
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        Objective: See treatment diary below      Assessment: Tolerated treatment well. Patient exhibited good technique with therapeutic exercises and would benefit from continued PT      Plan: Continue per plan of care.      Precautions: None      Manuals 3/12            MFR soleus/gastroc AF            TC posterior glide AF                                      Neuro Re-Ed                                                                                                        Ther Ex             Self DF mob 30            Self MFR with foam roll Ankle pumps  30            Heel raise with posterior chain tension 30            Soleus raise 30                                                                Ther Activity                                       Gait Training                                       Modalities             EPAT 5 min

## 2024-03-15 ENCOUNTER — OFFICE VISIT (OUTPATIENT)
Dept: PHYSICAL THERAPY | Facility: MEDICAL CENTER | Age: 64
End: 2024-03-15
Payer: COMMERCIAL

## 2024-03-15 DIAGNOSIS — M92.61 HAGLUND'S DEFORMITY OF RIGHT HEEL: Primary | ICD-10-CM

## 2024-03-15 PROCEDURE — 97110 THERAPEUTIC EXERCISES: CPT | Performed by: PHYSICAL THERAPIST

## 2024-03-15 NOTE — PROGRESS NOTES
Daily Note     Today's date: 3/15/2024  Patient name: Elizabeth Chaudhari  : 1960  MRN: [id number]  Referring provider: Davon Saldivar, PT  Dx:   Encounter Diagnosis     ICD-10-CM    1. 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 Patient exhibited good technique with therapeutic exercises and would benefit from continued PT      Plan: Continue per plan of care.      Precautions: None      Manuals 3/15            MFR soleus/gastroc AF            TC posterior glide AF                                      Neuro Re-Ed                                                                                                        Ther Ex             Self DF mob 30            Self MFR with foam roll Ankle pumps  30            Heel raise with posterior chain tension 30            Soleus raise 30                                                                Ther Activity                                       Gait Training                                       Modalities             EPAT 5 min

## 2024-03-19 ENCOUNTER — OFFICE VISIT (OUTPATIENT)
Dept: PHYSICAL THERAPY | Facility: MEDICAL CENTER | Age: 64
End: 2024-03-19
Payer: COMMERCIAL

## 2024-03-19 DIAGNOSIS — M92.61 HAGLUND'S DEFORMITY OF RIGHT HEEL: Primary | ICD-10-CM

## 2024-03-19 PROCEDURE — 97140 MANUAL THERAPY 1/> REGIONS: CPT | Performed by: PHYSICAL THERAPIST

## 2024-03-19 PROCEDURE — 97110 THERAPEUTIC EXERCISES: CPT | Performed by: PHYSICAL THERAPIST

## 2024-03-19 NOTE — PROGRESS NOTES
Daily Note     Today's date: 3/19/2024  Patient name: Elizabeth Chaudhari  : 1960  MRN: [id number]  Referring provider: Davon Saldivar, PT  Dx:   Encounter Diagnosis     ICD-10-CM    1. 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 Patient exhibited good technique with therapeutic exercises and would benefit from continued PT      Plan: Continue per plan of care.      Precautions: None      Manuals 3/19            MFR soleus/gastroc AF            TC posterior glide AF                                      Neuro Re-Ed                                                                                                        Ther Ex             Self DF mob 30            Self MFR with foam roll Ankle pumps  30            Heel raise with posterior chain tension 30            Soleus raise 30                                                                Ther Activity                                       Gait Training                                       Modalities             EPAT 5 min

## 2024-03-22 ENCOUNTER — OFFICE VISIT (OUTPATIENT)
Dept: PHYSICAL THERAPY | Facility: MEDICAL CENTER | Age: 64
End: 2024-03-22
Payer: COMMERCIAL

## 2024-03-22 DIAGNOSIS — M92.61 HAGLUND'S DEFORMITY OF RIGHT HEEL: Primary | ICD-10-CM

## 2024-03-22 PROCEDURE — 97140 MANUAL THERAPY 1/> REGIONS: CPT | Performed by: PHYSICAL THERAPIST

## 2024-03-22 PROCEDURE — 97110 THERAPEUTIC EXERCISES: CPT | Performed by: PHYSICAL THERAPIST

## 2024-03-22 NOTE — PROGRESS NOTES
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 Patient exhibited good technique with therapeutic exercises and would benefit from continued PT      Plan: Continue per plan of care.      Precautions: None      Manuals 3/22            MFR soleus/gastroc AF            TC posterior glide AF                                      Neuro Re-Ed                                                                                                        Ther Ex             Self DF mob 30            Self MFR with foam roll Ankle pumps  30            Heel raise with posterior chain tension 30            Soleus raise 30                                                                Ther Activity                                       Gait Training                                       Modalities             EPAT 5 min

## 2024-03-26 ENCOUNTER — OFFICE VISIT (OUTPATIENT)
Dept: PHYSICAL THERAPY | Facility: MEDICAL CENTER | Age: 64
End: 2024-03-26
Payer: COMMERCIAL

## 2024-03-26 DIAGNOSIS — M92.61 HAGLUND'S DEFORMITY OF RIGHT HEEL: Primary | ICD-10-CM

## 2024-03-26 PROCEDURE — 97110 THERAPEUTIC EXERCISES: CPT | Performed by: PHYSICAL THERAPIST

## 2024-03-26 PROCEDURE — 97140 MANUAL THERAPY 1/> REGIONS: CPT | Performed by: PHYSICAL THERAPIST

## 2024-03-26 NOTE — PROGRESS NOTES
Daily Note     Today's date: 3/26/2024  Patient name: Elizabeth Chaudhari  : 1960  MRN: [id number]  Referring provider: Davon Saldivar, PT  Dx:   Encounter Diagnosis     ICD-10-CM    1. 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 Patient exhibited good technique with therapeutic exercises and would benefit from continued PT   Raul to DC next visit to [facility] and return if issues arise      Plan: Continue per plan of care.      Precautions: None      Manuals 3/26            MFR soleus/gastroc AF            TC posterior glide AF                                      Neuro Re-Ed                                                                                                        Ther Ex             Self DF mob 30            Self MFR with foam roll Ankle pumps  30            Heel raise with posterior chain tension 30            Soleus raise 30                                                                Ther Activity                                       Gait Training                                       Modalities             EPAT 5 min

## 2024-03-29 ENCOUNTER — OFFICE VISIT (OUTPATIENT)
Dept: PHYSICAL THERAPY | Facility: MEDICAL CENTER | Age: 64
End: 2024-03-29
Payer: COMMERCIAL

## 2024-03-29 DIAGNOSIS — M92.61 HAGLUND'S DEFORMITY OF RIGHT HEEL: Primary | ICD-10-CM

## 2024-03-29 PROCEDURE — 97110 THERAPEUTIC EXERCISES: CPT | Performed by: PHYSICAL THERAPIST

## 2024-03-29 PROCEDURE — 97140 MANUAL THERAPY 1/> REGIONS: CPT | Performed by: PHYSICAL THERAPIST

## 2024-03-29 NOTE — PROGRESS NOTES
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 Patient doing well, discharge to [facility] and only return if needed      Plan: Continue per plan of care.      Precautions: None      Manuals 3/29            MFR soleus/gastroc AF            TC posterior glide AF                                      Neuro Re-Ed                                                                                                        Ther Ex             Self DF mob 30            Self MFR with foam roll Ankle pumps  30            Heel raise with posterior chain tension 30            Soleus raise 30                                                                Ther Activity                                       Gait Training                                       Modalities             EPAT 5 min

## 2025-04-07 DIAGNOSIS — Z00.6 ENCOUNTER FOR EXAMINATION FOR NORMAL COMPARISON OR CONTROL IN CLINICAL RESEARCH PROGRAM: ICD-10-CM

## 2025-04-30 ENCOUNTER — APPOINTMENT (OUTPATIENT)
Dept: LAB | Facility: CLINIC | Age: 65
End: 2025-04-30

## 2025-04-30 DIAGNOSIS — Z00.6 ENCOUNTER FOR EXAMINATION FOR NORMAL COMPARISON OR CONTROL IN CLINICAL RESEARCH PROGRAM: ICD-10-CM

## 2025-04-30 PROCEDURE — 36415 COLL VENOUS BLD VENIPUNCTURE: CPT

## 2025-05-11 LAB
APOB+LDLR+PCSK9 GENE MUT ANL BLD/T: NOT DETECTED
BRCA1+BRCA2 DEL+DUP + FULL MUT ANL BLD/T: NOT DETECTED
MLH1+MSH2+MSH6+PMS2 GN DEL+DUP+FUL M: NOT DETECTED